# Patient Record
Sex: FEMALE | Race: WHITE | Employment: UNEMPLOYED | URBAN - METROPOLITAN AREA
[De-identification: names, ages, dates, MRNs, and addresses within clinical notes are randomized per-mention and may not be internally consistent; named-entity substitution may affect disease eponyms.]

---

## 2019-04-29 ENCOUNTER — HOSPITAL ENCOUNTER (OUTPATIENT)
Dept: MRI IMAGING | Age: 64
Discharge: HOME | End: 2019-04-29
Payer: COMMERCIAL

## 2019-04-29 DIAGNOSIS — M54.16 LUMBAR RADICULOPATHY: ICD-10-CM

## 2019-04-29 PROCEDURE — 72148 MRI LUMBAR SPINE W/O DYE: CPT

## 2019-10-02 ENCOUNTER — HOSPITAL ENCOUNTER (OUTPATIENT)
Dept: PREADMISSION TESTING | Age: 64
Discharge: HOME | End: 2019-10-02
Payer: COMMERCIAL

## 2019-10-02 ENCOUNTER — HOSPITAL ENCOUNTER (OUTPATIENT)
Dept: GENERAL RADIOLOGY | Age: 64
Discharge: HOME | End: 2019-10-02
Attending: ORTHOPAEDIC SURGERY
Payer: COMMERCIAL

## 2019-10-02 VITALS
BODY MASS INDEX: 26.1 KG/M2 | WEIGHT: 124.34 LBS | DIASTOLIC BLOOD PRESSURE: 64 MMHG | SYSTOLIC BLOOD PRESSURE: 160 MMHG | TEMPERATURE: 98.2 F | HEART RATE: 56 BPM | RESPIRATION RATE: 20 BRPM | HEIGHT: 58 IN | OXYGEN SATURATION: 97 %

## 2019-10-02 LAB
25(OH)D3 SERPL-MCNC: 19.3 NG/ML (ref 30–100)
ABO + RH BLD: NORMAL
ALBUMIN SERPL-MCNC: 3.6 G/DL (ref 3.5–5)
ALBUMIN/GLOB SERPL: 1 {RATIO} (ref 1.1–2.2)
ALP SERPL-CCNC: 84 U/L (ref 45–117)
ALT SERPL-CCNC: 26 U/L (ref 12–78)
ANION GAP SERPL CALC-SCNC: 2 MMOL/L (ref 5–15)
APPEARANCE UR: ABNORMAL
AST SERPL-CCNC: 20 U/L (ref 15–37)
BACTERIA URNS QL MICRO: NEGATIVE /HPF
BILIRUB SERPL-MCNC: 0.2 MG/DL (ref 0.2–1)
BILIRUB UR QL: NEGATIVE
BLOOD GROUP ANTIBODIES SERPL: NORMAL
BUN SERPL-MCNC: 23 MG/DL (ref 6–20)
BUN/CREAT SERPL: 23 (ref 12–20)
CALCIUM SERPL-MCNC: 8.8 MG/DL (ref 8.5–10.1)
CHLORIDE SERPL-SCNC: 113 MMOL/L (ref 97–108)
CO2 SERPL-SCNC: 27 MMOL/L (ref 21–32)
COLOR UR: ABNORMAL
CREAT SERPL-MCNC: 1 MG/DL (ref 0.55–1.02)
EPITH CASTS URNS QL MICRO: ABNORMAL /LPF
ERYTHROCYTE [DISTWIDTH] IN BLOOD BY AUTOMATED COUNT: 12.9 % (ref 11.5–14.5)
GLOBULIN SER CALC-MCNC: 3.6 G/DL (ref 2–4)
GLUCOSE SERPL-MCNC: 75 MG/DL (ref 65–100)
GLUCOSE UR STRIP.AUTO-MCNC: NEGATIVE MG/DL
HCT VFR BLD AUTO: 38.5 % (ref 35–47)
HGB BLD-MCNC: 12.2 G/DL (ref 11.5–16)
HGB UR QL STRIP: NEGATIVE
INR PPP: 1 (ref 0.9–1.1)
KETONES UR QL STRIP.AUTO: NEGATIVE MG/DL
LEUKOCYTE ESTERASE UR QL STRIP.AUTO: NEGATIVE
MCH RBC QN AUTO: 31.9 PG (ref 26–34)
MCHC RBC AUTO-ENTMCNC: 31.7 G/DL (ref 30–36.5)
MCV RBC AUTO: 100.5 FL (ref 80–99)
NITRITE UR QL STRIP.AUTO: NEGATIVE
NRBC # BLD: 0 K/UL (ref 0–0.01)
NRBC BLD-RTO: 0 PER 100 WBC
PH UR STRIP: 7 [PH] (ref 5–8)
PLATELET # BLD AUTO: 249 K/UL (ref 150–400)
PMV BLD AUTO: 9.9 FL (ref 8.9–12.9)
POTASSIUM SERPL-SCNC: 4.1 MMOL/L (ref 3.5–5.1)
PREALB SERPL-MCNC: 26.9 MG/DL (ref 20–40)
PROT SERPL-MCNC: 7.2 G/DL (ref 6.4–8.2)
PROT UR STRIP-MCNC: NEGATIVE MG/DL
PROTHROMBIN TIME: 9.8 SEC (ref 9–11.1)
RBC # BLD AUTO: 3.83 M/UL (ref 3.8–5.2)
RBC #/AREA URNS HPF: ABNORMAL /HPF (ref 0–5)
SODIUM SERPL-SCNC: 142 MMOL/L (ref 136–145)
SP GR UR REFRACTOMETRY: 1.02 (ref 1–1.03)
SPECIMEN EXP DATE BLD: NORMAL
UA: UC IF INDICATED,UAUC: ABNORMAL
UROBILINOGEN UR QL STRIP.AUTO: 1 EU/DL (ref 0.2–1)
WBC # BLD AUTO: 6.1 K/UL (ref 3.6–11)
WBC URNS QL MICRO: ABNORMAL /HPF (ref 0–4)

## 2019-10-02 PROCEDURE — 80053 COMPREHEN METABOLIC PANEL: CPT

## 2019-10-02 PROCEDURE — 36415 COLL VENOUS BLD VENIPUNCTURE: CPT

## 2019-10-02 PROCEDURE — 85610 PROTHROMBIN TIME: CPT

## 2019-10-02 PROCEDURE — 81001 URINALYSIS AUTO W/SCOPE: CPT

## 2019-10-02 PROCEDURE — 83036 HEMOGLOBIN GLYCOSYLATED A1C: CPT

## 2019-10-02 PROCEDURE — 84134 ASSAY OF PREALBUMIN: CPT

## 2019-10-02 PROCEDURE — 85027 COMPLETE CBC AUTOMATED: CPT

## 2019-10-02 PROCEDURE — 97530 THERAPEUTIC ACTIVITIES: CPT

## 2019-10-02 PROCEDURE — 82306 VITAMIN D 25 HYDROXY: CPT

## 2019-10-02 PROCEDURE — 97161 PT EVAL LOW COMPLEX 20 MIN: CPT

## 2019-10-02 PROCEDURE — 86900 BLOOD TYPING SEROLOGIC ABO: CPT

## 2019-10-02 PROCEDURE — 71046 X-RAY EXAM CHEST 2 VIEWS: CPT

## 2019-10-02 RX ORDER — ALBUTEROL SULFATE 90 UG/1
2 AEROSOL, METERED RESPIRATORY (INHALATION) AS NEEDED
COMMUNITY

## 2019-10-02 RX ORDER — ACETAMINOPHEN 10 MG/ML
1000 INJECTION, SOLUTION INTRAVENOUS ONCE
Status: CANCELLED | OUTPATIENT
Start: 2019-10-14 | End: 2019-10-14

## 2019-10-02 RX ORDER — METOPROLOL TARTRATE 25 MG/1
25 TABLET, FILM COATED ORAL
COMMUNITY

## 2019-10-02 RX ORDER — FLUTICASONE PROPIONATE AND SALMETEROL 250; 50 UG/1; UG/1
1 POWDER RESPIRATORY (INHALATION) 2 TIMES DAILY
COMMUNITY

## 2019-10-02 RX ORDER — ASPIRIN 81 MG/1
81 TABLET ORAL DAILY
COMMUNITY
End: 2019-10-16

## 2019-10-02 RX ORDER — PREGABALIN 150 MG/1
150 CAPSULE ORAL ONCE
Status: CANCELLED | OUTPATIENT
Start: 2019-10-14 | End: 2019-10-14

## 2019-10-02 RX ORDER — TOPIRAMATE 100 MG/1
100 TABLET, FILM COATED ORAL 2 TIMES DAILY
COMMUNITY

## 2019-10-02 RX ORDER — CHOLECALCIFEROL TAB 125 MCG (5000 UNIT) 125 MCG
5000 TAB ORAL DAILY
COMMUNITY

## 2019-10-02 RX ORDER — ATORVASTATIN CALCIUM 20 MG/1
20 TABLET, FILM COATED ORAL
COMMUNITY

## 2019-10-02 RX ORDER — ALPRAZOLAM 0.5 MG/1
TABLET ORAL 2 TIMES DAILY
COMMUNITY

## 2019-10-02 RX ORDER — DIPHENHYDRAMINE HCL 25 MG
25 CAPSULE ORAL AS NEEDED
COMMUNITY

## 2019-10-02 RX ORDER — LEVOFLOXACIN 5 MG/ML
500 INJECTION, SOLUTION INTRAVENOUS ONCE
Status: CANCELLED | OUTPATIENT
Start: 2019-10-14 | End: 2019-10-14

## 2019-10-02 RX ORDER — GABAPENTIN 100 MG/1
100 CAPSULE ORAL 3 TIMES DAILY
COMMUNITY

## 2019-10-02 RX ORDER — PROMETHAZINE HYDROCHLORIDE 25 MG/1
25 TABLET ORAL AS NEEDED
COMMUNITY

## 2019-10-02 RX ORDER — BACLOFEN 10 MG/1
10 TABLET ORAL 3 TIMES DAILY
COMMUNITY

## 2019-10-02 NOTE — PERIOP NOTES
[facility]  Joint/Spine Preoperative Instructions        Surgery Date 10/14/19         Time of Arrival 1200  Contact # [phone]    1. On the day of your surgery, please report to the Surgical Services Registration Desk and sign in at your designated time. The Surgery Center is located to the right of the Emergency Room. 2. You must have someone with you to drive you home. You should not drive a car for 24 hours following surgery. Please make arrangements for a friend or family member to stay with you for the first 24 hours after your surgery. 3. No food after midnight 10/13/19. Medications morning of surgery should be taken with a sip of water. Please follow pre-surgery drink instructions that were given at your Pre Admission Testing appointment. 4. We recommend you do not drink any alcoholic beverages for 24 hours before and after your surgery. 5. Contact your surgeons office for instructions on the following medications: non-steroidal anti-inflammatory drugs (i.e. Advil, Aleve), vitamins, and supplements. (Some surgeons will want you to stop these medications prior to surgery and others may allow you to take them)  **If you are currently taking Plavix, Coumadin, Aspirin and/or other blood-thinning agents, contact your surgeon for instructions. ** Your surgeon will partner with the physician prescribing these medications to determine if it is safe to stop or if you need to continue taking. Please do not stop taking these medications without instructions from your surgeon    6. Wear comfortable clothes. Wear glasses instead of contacts. Do not bring any money or jewelry. Please bring picture ID, insurance card, and any prearranged co-payment or hospital payment. Do not wear make-up, particularly mascara the morning of your surgery. Do not wear nail polish, particularly if you are having foot /hand surgery.   Wear your hair loose or down, no ponytails, buns, charles pins or clips. All body piercings must be removed. Please shower with antibacterial soap for three consecutive days before and on the morning of surgery, but do not apply any lotions, powders or deodorants after the shower on the day of surgery. Please use a fresh towels after each shower. Please sleep in clean clothes and change bed linens the night before surgery. Please do not shave for 48 hours prior to surgery. Shaving of the face is acceptable. 7. You should understand that if you do not follow these instructions your surgery may be cancelled. If your physical condition changes (I.e. fever, cold or flu) please contact your surgeon as soon as possible. 8. It is important that you be on time. If a situation occurs where you may be late, please call [phone] (OR Holding Area). 9. If you have any questions and or problems, please call [phone] (Pre-admission Testing). 10. Your surgery time may be subject to change. You will receive a phone call the evening prior if your time changes. 11.  If having outpatient surgery, you must have someone to drive you here, stay with you during the duration of your stay, and to drive you home at time of discharge. 12.   In an effort to improve the efficiency, privacy, and safety for all of our Pre-op patients visitors are not allowed in the Holding area. Once you arrive and are registered your family/visitors will be asked to remain in the waiting room. The Pre-op staff will get you from the Surgical Waiting Area and will explain to you and your family/visitors that the Pre-op phase is beginning. The staff will answer any questions and provide instructions for tracking of the patient, by use of the existing tracking number and color-coded status board in the waiting room.   At this time the staff will also ask for your designated spokesperson information in the event that the physician or staff need to provide an update or obtain any pertinent information. The designated spokesperson will be notified if the physician needs to speak to family during the pre-operative phase. If at any time your family/visitors has questions or concerns they may approach the volunteer desk in the waiting area for assistance. Special Instructions:Practice with the incentive spirometry/please bring incentive spirometry back to the hospital for use  Please drink the Pre-Surgery drinks as instructed    [address] WITH A SIP OF WATER:  Inhaler as needed,please bring inhaler to the hospital,xanax,baclofen,topamax  Metoprolol,gabapentin,diskus inhaler,atorvastatin,phenergan as needed,benadryl as needed    I understand a pre-operative phone call will be made to verify my surgery time. In the event that I am not available, I give permission for a message to be left on my answering service and/or with another person?   yes        ___________________      __________   _________    (Signature of Patient)             (Witness)                (Date and Time)

## 2019-10-02 NOTE — PERIOP NOTES
Preventing Infections Before and After  Your Surgery    IMPORTANT INSTRUCTIONS    Please read and follow these instructions carefully. If you are unable to comply with the below instructions your procedure will be cancelled. Every Night for Three (3) nights before your surgery:  1. Shower with an antibacterial soap, such as Dial, or the soap provided at your preassessment appointment. A shower is better than a bath for cleaning your skin. 2. If needed, ask someone to help you reach all areas of your body. Dont forget to clean your belly button with every shower. The night before your surgery: If you lose your Hibiclens please contact surgery center or you can purchase it at a local pharmacy  1. On the night before your surgery, shower with an antibacterial soap, such as Dial, or the soap provided at your preassessment appointment. 2. With one packet of Hibiclens in hand, turn water off.  3. Apply Hibiclens antiseptic skin cleanser with a clean, freshly washed washcloth. ? Gently apply to your body from chin to toes (except the genital area) and especially the area(s) where your incision(s) will be. ? Leave Hibiclens on your skin for at least 20 seconds. CAUTION: If needed, Hibiclens may be used to clean the folds of skin of the legs (such as in the area of the groin) and on your buttocks and hips. However, do not use Hibiclens above the neck or in the genital area (your bottom) or put inside any area of your body. 4. Turn the water back on and rinse. 5. Dry gently with a clean, freshly washed towel. 6. After your shower, do not use any powder, deodorant, perfumes or lotion. 7. Use clean, freshly washed towels and washcloths every time you shower. 8. Wear clean, freshly washed pajamas to bed the night before surgery. 9. Sleep on clean, freshly washed sheets. 10. Do not allow pets to sleep in your bed with you. The Morning of your surgery:  1.  Shower again thoroughly with an antibacterial soap, such as Dial or the soap provided at your preassessment appointment. If needed, ask someone for help to reach all areas of your body. Dont forget to clean your belly button! Rinse. 2. Dry gently with a clean, freshly washed towel. 3. After your shower, do not use any powder, deodorant, perfumes or lotion prior to surgery. 4. Put on clean, freshly washed clothing. Tips to help prevent infections after your surgery:  1. Protect your surgical wound from germs:  ? Hand washing is the most important thing you and your caregivers can do to prevent infections. ? Keep your bandage clean and dry! ? Do not touch your surgical wound. 2. Use clean, freshly washed towels and washcloths every time you shower; do not share bath linens with others. 3. Until your surgical wound is healed, wear clothing and sleep on bed linens each day that are clean and freshly washed. 4. Do not allow pets to sleep in your bed with you or touch your surgical wound. 5. Do not smoke  smoking delays wound healing. This may be a good time to stop smoking. 6. If you have diabetes, it is important for you to manage your blood sugar levels properly before your surgery as well as after your surgery. Poorly managed blood sugar levels slow down wound healing and prevent you from healing completely. If you lose your Hibiclens, please call the [facility], or it is available for purchase at your pharmacy.                ___________________      ___________________      ________________  (Signature of Patient)          (Witness)                   (Date and Time)
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
## 2019-10-02 NOTE — PERIOP NOTES
Incentive Spirometer        Using the incentive spirometer helps expand the small air sacs of your lungs, helps you breathe deeply, and helps improve your lung function. Use your incentive spirometer twice a day (10 breaths each time) prior to surgery. How to Use Your Incentive Spirometer:  1. Hold the incentive spirometer in an upright position. 2. Breathe out as usual.   3. Place the mouthpiece in your mouth and seal your lips tightly around it. 4. Take a deep breath. Breathe in slowly and as deeply as possible. Keep the blue flow rate guide between the arrows. 5. Hold your breath as long as possible. Then exhale slowly and allow the piston to fall to the bottom of the column. 6. Rest for a few seconds and repeat steps one through five at least 10 times. PAT Tidal Volume____1500______________  x_____2___________  Date______10/02/19_________________    Thennita Porrasie THE INCENTIVE SPIROMETER WITH YOU TO THE HOSPITAL ON THE DAY OF YOUR SURGERY. Opportunity given to ask and answer questions as well as to observe return demonstration.     Patient signature_____________________________    Witness____________________________

## 2019-10-02 NOTE — PERIOP NOTES

## 2019-10-03 LAB
BACTERIA SPEC CULT: ABNORMAL
BACTERIA SPEC CULT: ABNORMAL
EST. AVERAGE GLUCOSE BLD GHB EST-MCNC: 108 MG/DL
HBA1C MFR BLD: 5.4 % (ref 4.2–6.3)
SERVICE CMNT-IMP: ABNORMAL

## 2019-10-03 RX ORDER — MUPIROCIN 20 MG/G
OINTMENT TOPICAL 2 TIMES DAILY
Qty: 22 G | Refills: 0 | Status: SHIPPED | OUTPATIENT
Start: 2019-10-03 | End: 2019-10-08

## 2019-10-03 RX ORDER — MUPIROCIN 20 MG/G
OINTMENT TOPICAL 2 TIMES DAILY
COMMUNITY
Start: 2019-10-03 | End: 2019-10-08

## 2019-10-03 NOTE — ADVANCED PRACTICE NURSE
Mupirocin ointment escribed to pt's pharmacy on file. LM on VM for pt to return call regarding results and need for tx. PTA medlist updated.

## 2019-10-04 NOTE — PERIOP NOTES
Patient returned phone call and I instructed her to  mupirocin prescription today and gave instructions ( to use in nares bid). Patient verbalized understanding.

## 2019-10-14 ENCOUNTER — ANESTHESIA EVENT (OUTPATIENT)
Dept: SURGERY | Age: 64
DRG: 455 | End: 2019-10-14
Payer: COMMERCIAL

## 2019-10-14 ENCOUNTER — APPOINTMENT (OUTPATIENT)
Dept: GENERAL RADIOLOGY | Age: 64
DRG: 455 | End: 2019-10-14
Attending: ORTHOPAEDIC SURGERY
Payer: COMMERCIAL

## 2019-10-14 ENCOUNTER — HOSPITAL ENCOUNTER (INPATIENT)
Age: 64
LOS: 2 days | Discharge: HOME | DRG: 455 | End: 2019-10-16
Attending: ORTHOPAEDIC SURGERY | Admitting: ORTHOPAEDIC SURGERY
Payer: COMMERCIAL

## 2019-10-14 ENCOUNTER — APPOINTMENT (OUTPATIENT)
Dept: CT IMAGING | Age: 64
DRG: 455 | End: 2019-10-14
Attending: ORTHOPAEDIC SURGERY
Payer: COMMERCIAL

## 2019-10-14 ENCOUNTER — ANESTHESIA (OUTPATIENT)
Dept: SURGERY | Age: 64
DRG: 455 | End: 2019-10-14
Payer: COMMERCIAL

## 2019-10-14 DIAGNOSIS — Z98.1 S/P LUMBAR SPINAL FUSION: Primary | ICD-10-CM

## 2019-10-14 PROBLEM — M48.02 CERVICAL STENOSIS OF SPINAL CANAL: Status: ACTIVE | Noted: 2019-10-14

## 2019-10-14 PROCEDURE — 77030014007 HC SPNG HEMSTAT J&J -B: Performed by: ORTHOPAEDIC SURGERY

## 2019-10-14 PROCEDURE — 77030035129: Performed by: ORTHOPAEDIC SURGERY

## 2019-10-14 PROCEDURE — 77030034475 HC MISC IMPL SPN: Performed by: ORTHOPAEDIC SURGERY

## 2019-10-14 PROCEDURE — 77030014647 HC SEAL FBRN TISSL BAXT -D: Performed by: ORTHOPAEDIC SURGERY

## 2019-10-14 PROCEDURE — 76210000006 HC OR PH I REC 0.5 TO 1 HR: Performed by: ORTHOPAEDIC SURGERY

## 2019-10-14 PROCEDURE — 77030019908 HC STETH ESOPH SIMS -A: Performed by: ANESTHESIOLOGY

## 2019-10-14 PROCEDURE — 77030014008 HC SPNG HEMSTAT J&J -C: Performed by: ORTHOPAEDIC SURGERY

## 2019-10-14 PROCEDURE — P9045 ALBUMIN (HUMAN), 5%, 250 ML: HCPCS | Performed by: NURSE ANESTHETIST, CERTIFIED REGISTERED

## 2019-10-14 PROCEDURE — 77030040361 HC SLV COMPR DVT MDII -B: Performed by: ORTHOPAEDIC SURGERY

## 2019-10-14 PROCEDURE — 76010000171 HC OR TIME 2 TO 2.5 HR INTENSV-TIER 1: Performed by: ORTHOPAEDIC SURGERY

## 2019-10-14 PROCEDURE — 77030018846 HC SOL IRR STRL H20 ICUM -A: Performed by: ORTHOPAEDIC SURGERY

## 2019-10-14 PROCEDURE — C1713 ANCHOR/SCREW BN/BN,TIS/BN: HCPCS | Performed by: ORTHOPAEDIC SURGERY

## 2019-10-14 PROCEDURE — 77030040922 HC BLNKT HYPOTHRM STRY -A

## 2019-10-14 PROCEDURE — 74011250636 HC RX REV CODE- 250/636: Performed by: NURSE ANESTHETIST, CERTIFIED REGISTERED

## 2019-10-14 PROCEDURE — 65270000029 HC RM PRIVATE

## 2019-10-14 PROCEDURE — 77030003029 HC SUT VCRL J&J -B: Performed by: ORTHOPAEDIC SURGERY

## 2019-10-14 PROCEDURE — 77030008462 HC STPLR SKN PROX J&J -A: Performed by: ORTHOPAEDIC SURGERY

## 2019-10-14 PROCEDURE — 77030026438 HC STYL ET INTUB CARD -A: Performed by: ANESTHESIOLOGY

## 2019-10-14 PROCEDURE — 74011000250 HC RX REV CODE- 250: Performed by: NURSE ANESTHETIST, CERTIFIED REGISTERED

## 2019-10-14 PROCEDURE — 77030039267 HC ADH SKN EXOFIN S2SG -B: Performed by: ORTHOPAEDIC SURGERY

## 2019-10-14 PROCEDURE — 77030029099 HC BN WAX SSPC -A: Performed by: ORTHOPAEDIC SURGERY

## 2019-10-14 PROCEDURE — 77030040356 HC CORD BPLR FRCP COVD -A: Performed by: ORTHOPAEDIC SURGERY

## 2019-10-14 PROCEDURE — 77030020268 HC MISC GENERAL SUPPLY: Performed by: ORTHOPAEDIC SURGERY

## 2019-10-14 PROCEDURE — 77030008684 HC TU ET CUF COVD -B: Performed by: ANESTHESIOLOGY

## 2019-10-14 PROCEDURE — 77030033138 HC SUT PGA STRATFX J&J -B: Performed by: ORTHOPAEDIC SURGERY

## 2019-10-14 PROCEDURE — 77030003028 HC SUT VCRL J&J -A: Performed by: ORTHOPAEDIC SURGERY

## 2019-10-14 PROCEDURE — 74011250637 HC RX REV CODE- 250/637: Performed by: ORTHOPAEDIC SURGERY

## 2019-10-14 PROCEDURE — 77030005513 HC CATH URETH FOL11 MDII -B: Performed by: ORTHOPAEDIC SURGERY

## 2019-10-14 PROCEDURE — 74011250636 HC RX REV CODE- 250/636: Performed by: ORTHOPAEDIC SURGERY

## 2019-10-14 PROCEDURE — 07DR0ZZ EXTRACTION OF ILIAC BONE MARROW, OPEN APPROACH: ICD-10-PCS | Performed by: ORTHOPAEDIC SURGERY

## 2019-10-14 PROCEDURE — 76000 FLUOROSCOPY <1 HR PHYS/QHP: CPT

## 2019-10-14 PROCEDURE — 0SG00A0 FUSION OF LUMBAR VERTEBRAL JOINT WITH INTERBODY FUSION DEVICE, ANTERIOR APPROACH, ANTERIOR COLUMN, OPEN APPROACH: ICD-10-PCS | Performed by: ORTHOPAEDIC SURGERY

## 2019-10-14 PROCEDURE — 77030010512 HC APPL CLP LIG J&J -C: Performed by: ORTHOPAEDIC SURGERY

## 2019-10-14 PROCEDURE — 77030029251 HC SPCR SPN GLMB -K1: Performed by: ORTHOPAEDIC SURGERY

## 2019-10-14 PROCEDURE — 77030018836 HC SOL IRR NACL ICUM -A: Performed by: ORTHOPAEDIC SURGERY

## 2019-10-14 PROCEDURE — 76060000035 HC ANESTHESIA 2 TO 2.5 HR: Performed by: ORTHOPAEDIC SURGERY

## 2019-10-14 PROCEDURE — 74011250636 HC RX REV CODE- 250/636: Performed by: ANESTHESIOLOGY

## 2019-10-14 PROCEDURE — 72100 X-RAY EXAM L-S SPINE 2/3 VWS: CPT

## 2019-10-14 DEVICE — RISE-L SPACER 22 X 50MM, 7-14MM, 3-15&DEG;
Type: IMPLANTABLE DEVICE | Site: SPINE LUMBAR | Status: FUNCTIONAL
Brand: RISE-L

## 2019-10-14 DEVICE — PLYMOUTH THORACOLUMBAR PLATE, 19MM
Type: IMPLANTABLE DEVICE | Site: SPINE LUMBAR | Status: FUNCTIONAL
Brand: PLYMOUTH

## 2019-10-14 DEVICE — 5.5MM VARIABLE ANGLE SCREW, 26MM
Type: IMPLANTABLE DEVICE | Site: SPINE LUMBAR | Status: FUNCTIONAL
Brand: TRUSS

## 2019-10-14 RX ORDER — HYDROMORPHONE HYDROCHLORIDE 1 MG/ML
.2-.5 INJECTION, SOLUTION INTRAMUSCULAR; INTRAVENOUS; SUBCUTANEOUS
Status: DISCONTINUED | OUTPATIENT
Start: 2019-10-14 | End: 2019-10-14

## 2019-10-14 RX ORDER — NALOXONE HYDROCHLORIDE 0.4 MG/ML
0.4 INJECTION, SOLUTION INTRAMUSCULAR; INTRAVENOUS; SUBCUTANEOUS AS NEEDED
Status: DISCONTINUED | OUTPATIENT
Start: 2019-10-14 | End: 2019-10-16 | Stop reason: HOSPADM

## 2019-10-14 RX ORDER — ACETAMINOPHEN 500 MG
1000 TABLET ORAL EVERY 6 HOURS
Status: DISCONTINUED | OUTPATIENT
Start: 2019-10-14 | End: 2019-10-16 | Stop reason: HOSPADM

## 2019-10-14 RX ORDER — GABAPENTIN 100 MG/1
100 CAPSULE ORAL 3 TIMES DAILY
Status: DISCONTINUED | OUTPATIENT
Start: 2019-10-14 | End: 2019-10-16 | Stop reason: HOSPADM

## 2019-10-14 RX ORDER — NEOSTIGMINE METHYLSULFATE 1 MG/ML
INJECTION INTRAVENOUS AS NEEDED
Status: DISCONTINUED | OUTPATIENT
Start: 2019-10-14 | End: 2019-10-14 | Stop reason: HOSPADM

## 2019-10-14 RX ORDER — SODIUM CHLORIDE 0.9 % (FLUSH) 0.9 %
5-40 SYRINGE (ML) INJECTION AS NEEDED
Status: DISCONTINUED | OUTPATIENT
Start: 2019-10-14 | End: 2019-10-15 | Stop reason: HOSPADM

## 2019-10-14 RX ORDER — DIPHENHYDRAMINE HYDROCHLORIDE 50 MG/ML
12.5 INJECTION, SOLUTION INTRAMUSCULAR; INTRAVENOUS AS NEEDED
Status: ACTIVE | OUTPATIENT
Start: 2019-10-14 | End: 2019-10-14

## 2019-10-14 RX ORDER — GLYCOPYRROLATE 0.2 MG/ML
INJECTION INTRAMUSCULAR; INTRAVENOUS AS NEEDED
Status: DISCONTINUED | OUTPATIENT
Start: 2019-10-14 | End: 2019-10-14 | Stop reason: HOSPADM

## 2019-10-14 RX ORDER — ACETAMINOPHEN 10 MG/ML
1000 INJECTION, SOLUTION INTRAVENOUS ONCE
Status: COMPLETED | OUTPATIENT
Start: 2019-10-14 | End: 2019-10-14

## 2019-10-14 RX ORDER — MELATONIN
5000 DAILY
Status: DISCONTINUED | OUTPATIENT
Start: 2019-10-15 | End: 2019-10-16 | Stop reason: HOSPADM

## 2019-10-14 RX ORDER — SODIUM CHLORIDE 0.9 % (FLUSH) 0.9 %
5-40 SYRINGE (ML) INJECTION AS NEEDED
Status: DISCONTINUED | OUTPATIENT
Start: 2019-10-14 | End: 2019-10-16 | Stop reason: HOSPADM

## 2019-10-14 RX ORDER — ROCURONIUM BROMIDE 10 MG/ML
INJECTION, SOLUTION INTRAVENOUS AS NEEDED
Status: DISCONTINUED | OUTPATIENT
Start: 2019-10-14 | End: 2019-10-14 | Stop reason: HOSPADM

## 2019-10-14 RX ORDER — VITAMIN E CAP 100 UNIT 100 UNIT
200 CAP ORAL DAILY
Status: DISCONTINUED | OUTPATIENT
Start: 2019-10-15 | End: 2019-10-16 | Stop reason: HOSPADM

## 2019-10-14 RX ORDER — LIDOCAINE HYDROCHLORIDE 20 MG/ML
INJECTION, SOLUTION EPIDURAL; INFILTRATION; INTRACAUDAL; PERINEURAL AS NEEDED
Status: DISCONTINUED | OUTPATIENT
Start: 2019-10-14 | End: 2019-10-14 | Stop reason: HOSPADM

## 2019-10-14 RX ORDER — FLUTICASONE FUROATE AND VILANTEROL 100; 25 UG/1; UG/1
1 POWDER RESPIRATORY (INHALATION) DAILY
Status: DISCONTINUED | OUTPATIENT
Start: 2019-10-15 | End: 2019-10-16 | Stop reason: HOSPADM

## 2019-10-14 RX ORDER — UBIDECARENONE 50 MG
200 CAPSULE ORAL DAILY
Status: DISCONTINUED | OUTPATIENT
Start: 2019-10-15 | End: 2019-10-16 | Stop reason: HOSPADM

## 2019-10-14 RX ORDER — ALBUMIN HUMAN 50 G/1000ML
SOLUTION INTRAVENOUS AS NEEDED
Status: DISCONTINUED | OUTPATIENT
Start: 2019-10-14 | End: 2019-10-14 | Stop reason: HOSPADM

## 2019-10-14 RX ORDER — ALPRAZOLAM 0.5 MG/1
0.5 TABLET ORAL 2 TIMES DAILY
Status: DISCONTINUED | OUTPATIENT
Start: 2019-10-14 | End: 2019-10-16 | Stop reason: HOSPADM

## 2019-10-14 RX ORDER — SODIUM CHLORIDE, SODIUM LACTATE, POTASSIUM CHLORIDE, CALCIUM CHLORIDE 600; 310; 30; 20 MG/100ML; MG/100ML; MG/100ML; MG/100ML
25 INJECTION, SOLUTION INTRAVENOUS CONTINUOUS
Status: DISCONTINUED | OUTPATIENT
Start: 2019-10-14 | End: 2019-10-15 | Stop reason: HOSPADM

## 2019-10-14 RX ORDER — SODIUM CHLORIDE 9 MG/ML
125 INJECTION, SOLUTION INTRAVENOUS CONTINUOUS
Status: DISPENSED | OUTPATIENT
Start: 2019-10-14 | End: 2019-10-15

## 2019-10-14 RX ORDER — HYDROMORPHONE HYDROCHLORIDE 2 MG/1
4 TABLET ORAL
Status: DISCONTINUED | OUTPATIENT
Start: 2019-10-14 | End: 2019-10-15

## 2019-10-14 RX ORDER — GLUCOSAM/CHONDRO/HERB 149/HYAL 750-100 MG
1 TABLET ORAL DAILY
Status: DISCONTINUED | OUTPATIENT
Start: 2019-10-15 | End: 2019-10-16 | Stop reason: HOSPADM

## 2019-10-14 RX ORDER — FACIAL-BODY WIPES
10 EACH TOPICAL DAILY PRN
Status: DISCONTINUED | OUTPATIENT
Start: 2019-10-16 | End: 2019-10-16 | Stop reason: HOSPADM

## 2019-10-14 RX ORDER — SODIUM CHLORIDE 0.9 % (FLUSH) 0.9 %
5-40 SYRINGE (ML) INJECTION EVERY 8 HOURS
Status: DISCONTINUED | OUTPATIENT
Start: 2019-10-14 | End: 2019-10-16 | Stop reason: HOSPADM

## 2019-10-14 RX ORDER — SODIUM CHLORIDE 0.9 % (FLUSH) 0.9 %
5-40 SYRINGE (ML) INJECTION EVERY 8 HOURS
Status: DISCONTINUED | OUTPATIENT
Start: 2019-10-14 | End: 2019-10-15 | Stop reason: HOSPADM

## 2019-10-14 RX ORDER — EPHEDRINE SULFATE/0.9% NACL/PF 50 MG/5 ML
SYRINGE (ML) INTRAVENOUS AS NEEDED
Status: DISCONTINUED | OUTPATIENT
Start: 2019-10-14 | End: 2019-10-14 | Stop reason: HOSPADM

## 2019-10-14 RX ORDER — ATORVASTATIN CALCIUM 20 MG/1
20 TABLET, FILM COATED ORAL DAILY
Status: DISCONTINUED | OUTPATIENT
Start: 2019-10-15 | End: 2019-10-16 | Stop reason: HOSPADM

## 2019-10-14 RX ORDER — ONDANSETRON 2 MG/ML
4 INJECTION INTRAMUSCULAR; INTRAVENOUS AS NEEDED
Status: DISCONTINUED | OUTPATIENT
Start: 2019-10-14 | End: 2019-10-15 | Stop reason: HOSPADM

## 2019-10-14 RX ORDER — PROPOFOL 10 MG/ML
INJECTION, EMULSION INTRAVENOUS AS NEEDED
Status: DISCONTINUED | OUTPATIENT
Start: 2019-10-14 | End: 2019-10-14 | Stop reason: HOSPADM

## 2019-10-14 RX ORDER — PREGABALIN 75 MG/1
150 CAPSULE ORAL ONCE
Status: COMPLETED | OUTPATIENT
Start: 2019-10-14 | End: 2019-10-14

## 2019-10-14 RX ORDER — VITAMIN E ACETATE 134 MG
200 CAPSULE ORAL DAILY
Status: DISCONTINUED | OUTPATIENT
Start: 2019-10-15 | End: 2019-10-14

## 2019-10-14 RX ORDER — HYDROMORPHONE HYDROCHLORIDE 2 MG/1
2 TABLET ORAL
Status: DISCONTINUED | OUTPATIENT
Start: 2019-10-14 | End: 2019-10-15

## 2019-10-14 RX ORDER — SODIUM CHLORIDE, SODIUM LACTATE, POTASSIUM CHLORIDE, CALCIUM CHLORIDE 600; 310; 30; 20 MG/100ML; MG/100ML; MG/100ML; MG/100ML
25 INJECTION, SOLUTION INTRAVENOUS CONTINUOUS
Status: DISCONTINUED | OUTPATIENT
Start: 2019-10-14 | End: 2019-10-14

## 2019-10-14 RX ORDER — SODIUM CHLORIDE 0.9 % (FLUSH) 0.9 %
5-40 SYRINGE (ML) INJECTION AS NEEDED
Status: DISCONTINUED | OUTPATIENT
Start: 2019-10-14 | End: 2019-10-14

## 2019-10-14 RX ORDER — ALBUTEROL SULFATE 90 UG/1
2 AEROSOL, METERED RESPIRATORY (INHALATION) AS NEEDED
Status: DISCONTINUED | OUTPATIENT
Start: 2019-10-14 | End: 2019-10-16 | Stop reason: HOSPADM

## 2019-10-14 RX ORDER — UREA 10 %
100 LOTION (ML) TOPICAL DAILY
Status: DISCONTINUED | OUTPATIENT
Start: 2019-10-15 | End: 2019-10-16 | Stop reason: HOSPADM

## 2019-10-14 RX ORDER — HYDROMORPHONE HYDROCHLORIDE 2 MG/ML
INJECTION, SOLUTION INTRAMUSCULAR; INTRAVENOUS; SUBCUTANEOUS AS NEEDED
Status: DISCONTINUED | OUTPATIENT
Start: 2019-10-14 | End: 2019-10-14 | Stop reason: HOSPADM

## 2019-10-14 RX ORDER — HYDROXYZINE HYDROCHLORIDE 10 MG/1
10 TABLET, FILM COATED ORAL
Status: DISCONTINUED | OUTPATIENT
Start: 2019-10-14 | End: 2019-10-16 | Stop reason: HOSPADM

## 2019-10-14 RX ORDER — TOPIRAMATE 100 MG/1
100 TABLET, FILM COATED ORAL 2 TIMES DAILY
Status: DISCONTINUED | OUTPATIENT
Start: 2019-10-14 | End: 2019-10-16 | Stop reason: HOSPADM

## 2019-10-14 RX ORDER — FENTANYL CITRATE 50 UG/ML
INJECTION, SOLUTION INTRAMUSCULAR; INTRAVENOUS AS NEEDED
Status: DISCONTINUED | OUTPATIENT
Start: 2019-10-14 | End: 2019-10-14 | Stop reason: HOSPADM

## 2019-10-14 RX ORDER — ONDANSETRON 2 MG/ML
INJECTION INTRAMUSCULAR; INTRAVENOUS AS NEEDED
Status: DISCONTINUED | OUTPATIENT
Start: 2019-10-14 | End: 2019-10-14 | Stop reason: HOSPADM

## 2019-10-14 RX ORDER — FAMOTIDINE 20 MG/1
20 TABLET, FILM COATED ORAL DAILY
Status: DISCONTINUED | OUTPATIENT
Start: 2019-10-15 | End: 2019-10-16 | Stop reason: HOSPADM

## 2019-10-14 RX ORDER — HYDROMORPHONE HYDROCHLORIDE 1 MG/ML
1 INJECTION, SOLUTION INTRAMUSCULAR; INTRAVENOUS; SUBCUTANEOUS
Status: ACTIVE | OUTPATIENT
Start: 2019-10-14 | End: 2019-10-15

## 2019-10-14 RX ORDER — DIAZEPAM 5 MG/1
5 TABLET ORAL
Status: DISCONTINUED | OUTPATIENT
Start: 2019-10-14 | End: 2019-10-16 | Stop reason: HOSPADM

## 2019-10-14 RX ORDER — ONDANSETRON 2 MG/ML
4 INJECTION INTRAMUSCULAR; INTRAVENOUS
Status: ACTIVE | OUTPATIENT
Start: 2019-10-14 | End: 2019-10-15

## 2019-10-14 RX ORDER — POLYETHYLENE GLYCOL 3350 17 G/17G
17 POWDER, FOR SOLUTION ORAL DAILY
Status: DISCONTINUED | OUTPATIENT
Start: 2019-10-15 | End: 2019-10-16 | Stop reason: HOSPADM

## 2019-10-14 RX ORDER — CYCLOBENZAPRINE HCL 10 MG
10 TABLET ORAL
Status: DISCONTINUED | OUTPATIENT
Start: 2019-10-14 | End: 2019-10-16 | Stop reason: HOSPADM

## 2019-10-14 RX ORDER — MORPHINE SULFATE 10 MG/ML
2 INJECTION, SOLUTION INTRAMUSCULAR; INTRAVENOUS
Status: DISCONTINUED | OUTPATIENT
Start: 2019-10-14 | End: 2019-10-15 | Stop reason: HOSPADM

## 2019-10-14 RX ORDER — DIPHENHYDRAMINE HCL 25 MG
25 CAPSULE ORAL AS NEEDED
Status: DISCONTINUED | OUTPATIENT
Start: 2019-10-14 | End: 2019-10-16 | Stop reason: HOSPADM

## 2019-10-14 RX ORDER — AMOXICILLIN 250 MG
1 CAPSULE ORAL 2 TIMES DAILY
Status: DISCONTINUED | OUTPATIENT
Start: 2019-10-14 | End: 2019-10-16 | Stop reason: HOSPADM

## 2019-10-14 RX ORDER — MIDAZOLAM HYDROCHLORIDE 1 MG/ML
INJECTION, SOLUTION INTRAMUSCULAR; INTRAVENOUS AS NEEDED
Status: DISCONTINUED | OUTPATIENT
Start: 2019-10-14 | End: 2019-10-14 | Stop reason: HOSPADM

## 2019-10-14 RX ORDER — METOPROLOL TARTRATE 25 MG/1
25 TABLET, FILM COATED ORAL
Status: DISCONTINUED | OUTPATIENT
Start: 2019-10-15 | End: 2019-10-16 | Stop reason: HOSPADM

## 2019-10-14 RX ORDER — FENTANYL CITRATE 50 UG/ML
25 INJECTION, SOLUTION INTRAMUSCULAR; INTRAVENOUS
Status: DISCONTINUED | OUTPATIENT
Start: 2019-10-14 | End: 2019-10-15 | Stop reason: HOSPADM

## 2019-10-14 RX ORDER — SODIUM CHLORIDE 0.9 % (FLUSH) 0.9 %
5-40 SYRINGE (ML) INJECTION EVERY 8 HOURS
Status: DISCONTINUED | OUTPATIENT
Start: 2019-10-14 | End: 2019-10-14

## 2019-10-14 RX ORDER — LEVOFLOXACIN 5 MG/ML
500 INJECTION, SOLUTION INTRAVENOUS ONCE
Status: COMPLETED | OUTPATIENT
Start: 2019-10-14 | End: 2019-10-14

## 2019-10-14 RX ORDER — PROMETHAZINE HYDROCHLORIDE 25 MG/1
25 TABLET ORAL AS NEEDED
Status: DISCONTINUED | OUTPATIENT
Start: 2019-10-14 | End: 2019-10-16 | Stop reason: HOSPADM

## 2019-10-14 RX ADMIN — ONDANSETRON HYDROCHLORIDE 4 MG: 2 INJECTION, SOLUTION INTRAMUSCULAR; INTRAVENOUS at 15:04

## 2019-10-14 RX ADMIN — SODIUM CHLORIDE, SODIUM LACTATE, POTASSIUM CHLORIDE, AND CALCIUM CHLORIDE 25 ML/HR: 600; 310; 30; 20 INJECTION, SOLUTION INTRAVENOUS at 11:06

## 2019-10-14 RX ADMIN — Medication 10 ML: at 21:38

## 2019-10-14 RX ADMIN — HYDROMORPHONE HYDROCHLORIDE 0.5 MG: 1 INJECTION, SOLUTION INTRAMUSCULAR; INTRAVENOUS; SUBCUTANEOUS at 16:35

## 2019-10-14 RX ADMIN — Medication 10 ML: at 21:33

## 2019-10-14 RX ADMIN — PROPOFOL 100 MG: 10 INJECTION, EMULSION INTRAVENOUS at 13:19

## 2019-10-14 RX ADMIN — ACETAMINOPHEN 1000 MG: 500 TABLET ORAL at 18:22

## 2019-10-14 RX ADMIN — HYDROMORPHONE HYDROCHLORIDE 2 MG: 2 TABLET ORAL at 18:22

## 2019-10-14 RX ADMIN — GABAPENTIN 100 MG: 100 CAPSULE ORAL at 18:22

## 2019-10-14 RX ADMIN — FENTANYL CITRATE 50 MCG: 50 INJECTION, SOLUTION INTRAMUSCULAR; INTRAVENOUS at 15:12

## 2019-10-14 RX ADMIN — ACETAMINOPHEN 1000 MG: 10 INJECTION, SOLUTION INTRAVENOUS at 11:06

## 2019-10-14 RX ADMIN — Medication 5 MG: at 13:31

## 2019-10-14 RX ADMIN — SODIUM CHLORIDE 125 ML/HR: 900 INJECTION, SOLUTION INTRAVENOUS at 23:20

## 2019-10-14 RX ADMIN — FENTANYL CITRATE 50 MCG: 50 INJECTION, SOLUTION INTRAMUSCULAR; INTRAVENOUS at 13:19

## 2019-10-14 RX ADMIN — MIDAZOLAM HYDROCHLORIDE 2 MG: 1 INJECTION INTRAMUSCULAR; INTRAVENOUS at 13:12

## 2019-10-14 RX ADMIN — HYDROMORPHONE HYDROCHLORIDE 0.5 MG: 1 INJECTION, SOLUTION INTRAMUSCULAR; INTRAVENOUS; SUBCUTANEOUS at 16:50

## 2019-10-14 RX ADMIN — VANCOMYCIN HYDROCHLORIDE 1000 MG: 1 INJECTION, POWDER, LYOPHILIZED, FOR SOLUTION INTRAVENOUS at 11:13

## 2019-10-14 RX ADMIN — SODIUM CHLORIDE 125 ML/HR: 900 INJECTION, SOLUTION INTRAVENOUS at 15:30

## 2019-10-14 RX ADMIN — ALPRAZOLAM 0.5 MG: 0.5 TABLET ORAL at 21:33

## 2019-10-14 RX ADMIN — HYDROMORPHONE HYDROCHLORIDE 0.4 MG: 2 INJECTION, SOLUTION INTRAMUSCULAR; INTRAVENOUS; SUBCUTANEOUS at 15:09

## 2019-10-14 RX ADMIN — FENTANYL CITRATE 25 MCG: 50 INJECTION INTRAMUSCULAR; INTRAVENOUS at 15:55

## 2019-10-14 RX ADMIN — LEVOFLOXACIN 500 MG: 5 INJECTION, SOLUTION INTRAVENOUS at 13:16

## 2019-10-14 RX ADMIN — GABAPENTIN 100 MG: 100 CAPSULE ORAL at 21:33

## 2019-10-14 RX ADMIN — VANCOMYCIN HYDROCHLORIDE 1000 MG: 1 INJECTION, POWDER, LYOPHILIZED, FOR SOLUTION INTRAVENOUS at 23:19

## 2019-10-14 RX ADMIN — ALBUMIN (HUMAN) 250 ML: 2.5 SOLUTION INTRAVENOUS at 14:46

## 2019-10-14 RX ADMIN — LIDOCAINE HYDROCHLORIDE 60 MG: 20 INJECTION, SOLUTION EPIDURAL; INFILTRATION; INTRACAUDAL; PERINEURAL at 13:19

## 2019-10-14 RX ADMIN — ROCURONIUM BROMIDE 50 MG: 10 INJECTION INTRAVENOUS at 13:19

## 2019-10-14 RX ADMIN — NEOSTIGMINE METHYLSULFATE 3 MG: 1 INJECTION INTRAVENOUS at 15:04

## 2019-10-14 RX ADMIN — SENNOSIDES AND DOCUSATE SODIUM 1 TABLET: 8.6; 5 TABLET ORAL at 18:22

## 2019-10-14 RX ADMIN — TOPIRAMATE 100 MG: 100 TABLET, FILM COATED ORAL at 21:33

## 2019-10-14 RX ADMIN — GLYCOPYRROLATE 0.4 MG: 0.2 INJECTION, SOLUTION INTRAMUSCULAR; INTRAVENOUS at 15:04

## 2019-10-14 RX ADMIN — ACETAMINOPHEN 1000 MG: 500 TABLET ORAL at 23:20

## 2019-10-14 RX ADMIN — Medication 10 MG: at 14:10

## 2019-10-14 RX ADMIN — FENTANYL CITRATE 25 MCG: 50 INJECTION INTRAMUSCULAR; INTRAVENOUS at 15:42

## 2019-10-14 RX ADMIN — HYDROMORPHONE HYDROCHLORIDE 2 MG: 2 TABLET ORAL at 21:32

## 2019-10-14 RX ADMIN — PREGABALIN 150 MG: 75 CAPSULE ORAL at 11:16

## 2019-10-14 NOTE — PERIOP NOTES
Handoff Report from Operating Room to PACU    Report received from Smitha Joseph RN and Karol Aguirre CRNA regarding [facility]. Surgeon(s):  MD Oscar Martinez MD  And Procedure(s) (LRB):  L4-5 LATERAL FUSION (N/A)  confirmed   with allergies, drains and dressings discussed. Anesthesia type, drugs, patient history, complications, estimated blood loss, vital signs, intake and output, and last pain medication, lines, reversal medications and temperature were reviewed.

## 2019-10-14 NOTE — PERIOP NOTES
Family updated. Aware of awaiting room assignment. No questions at this time. Will update as able. Patient currently resting comfortably/sleeping. VS as noted.

## 2019-10-14 NOTE — DISCHARGE INSTRUCTIONS
[address] [facility]    Discharge Instruction Sheet: POSTERIOR SPINAL FUSION    DR. Steve Sullivan    Pain control:   Typically, we will prescribe a narcotic usually 1-2 tabs every four hours is    sufficient for the pain. Most patients need this only for the first few weeks. You   should discontinue this as the pain decreases. You should not drive while taking any narcotic pain medications. Constipation  Pain medicines and anesthesia can be constipating-this can be prevented by gentle physical activity and drinking plenty of fluid. It should be treated with over-the-counter medications such as Miralax or suppositories, and/or Fleets enema. You should have a bowel movement at least every other day following surgery. Incision care     Keep this area clean and dry. Your dressing is designed to stay in place for 5-7 days. You will be sent home with one additional dressing to change at that time. Leave this new dressing in place until our follow up visit in the office in about 10-14 days. If staples are in place, they should be removed about 14-20 days after surgery. You may shower with this impermeable dressing in place. DO NOT take a tub bath or go swimming until cleared by your doctor. DO NOT apply lotions, oils, or creams to incision. To increase and promote healing:   Stop Smoking (or at least cut back on smoking).  Eat a well-balanced diet (high in protein and vitamin C)   If your appetite is poor, consider nutritional supplements like Ensure, Glucerna, or [place] Instant Breakfast.   If you are diabetic, controlling you blood sugars is very important to prevent infection and promote wound healing. Nutrition:   If you were on a supplement such as Ensure or Glucerna) while in the hospital, please continue using them with each meal for the next 30 days.    Eat a well-balanced diet - High in protein, high in vitamins and minerals, especially vitamin C and zinc.     Restrictions:   Remember your \"BLT's\"    1. Limited bending at waist    2. Lift no more than 10 pounds    3. No Twisting     If you were given a brace, wear it when out of bed. Warning signs : Please call your physician immediately at [phone] if you have   Bleeding from incision that is constant.  Change in mental status (unusual behavior or confusion)   If your incision develops redness or swelling   Change in wound drainage (increase in amount, color, or foul odor)   [place] over 101.5 degrees Fahrenheit    Headache that is not relieved with pain medication   Tenderness or redness in the calf of your leg    Emergency: CALL 911 if you have   Shortness of breath   Chest pain   Localized chest pain when coughing or taking a deep breath    Follow-up  Please call Dr. Coy Stout office for a follow up appointment in 2 weeks at [phone]. You can return to work when cleared by a physician. During normal business hours you may reach Dr. Domingo Trotter' team directly at [phone] if you have concerns or questions.     [facility]

## 2019-10-14 NOTE — ANESTHESIA PREPROCEDURE EVALUATION
Relevant Problems   No relevant active problems       Anesthetic History   No history of anesthetic complications            Review of Systems / Medical History  Patient summary reviewed, nursing notes reviewed and pertinent labs reviewed    Pulmonary    COPD               Neuro/Psych         Headaches and psychiatric history     Cardiovascular    Hypertension          CAD    Exercise tolerance: >4 METS  Comments: Mild to mod AI     GI/Hepatic/Renal     GERD      PUD     Endo/Other  Within defined limits           Other Findings            Physical Exam    Airway  Mallampati: II  TM Distance: 4 - 6 cm  Neck ROM: normal range of motion   Mouth opening: Normal     Cardiovascular          Murmur: Grade 3, Aortic area     Dental    Dentition: Full upper dentures and Full lower dentures     Pulmonary  Breath sounds clear to auscultation               Abdominal  GI exam deferred       Other Findings            Anesthetic Plan    ASA: 3  Anesthesia type: general            Anesthetic plan and risks discussed with: Patient      Cleared by cardiology

## 2019-10-14 NOTE — PROGRESS NOTES
Unable to perform L-Spine MAZOR scan at 1700hrs due to scanner being down. Primary RN asked me to call nursing supervisor due to patient getting surgery tomorrow. Nursing supervisor Venus Campuzano) aware and advised will call when I know something about the scanner.

## 2019-10-14 NOTE — H&P
Progress notes        Subjective:      Patient ID: Tanisha Montoya is a 71 y.o. female.     Chief Complaint: Pain of the Lower Back        HPI:  Tanisha Montoya is a 71 y.o. female following up after R L4-L5 and L5-S1 ESIs. She experienced worsening pain following the injections. She is experiencing low back pain radiating down the posterior RLE to the 5th toe and the entire lower left leg to the ankle. She denies symptoms in the anterior thighs, left thigh, left foot, or right big toe. Previously she had 3/5 bilateral hip flexors, quads, TA, and EHL weaknesses. The pain interferes with her ability to walk short distances and causes her to fall. She works on an [employer] and takes cymbalta and 100 mg gabapentin TID. It is rated 10 out of 10 on the VAS.         Patient Active Problem List     Diagnosis Date Noted    Intractable chronic migraine without aura and without status migrainosus 12/13/2017    Vitamin D deficiency 07/10/2017    Anxiety 03/28/2017    Benign hypertension 03/28/2017    Chronic obstructive lung disease 03/28/2017    Gastroesophageal reflux disease 03/28/2017    Hyperlipidemia 03/28/2017    Migraine 03/28/2017            Current Outpatient Medications:     acetaminophen (TYLENOL) 325 MG tablet, Take 650 mg by mouth every 6 (six) hours as needed, Disp: , Rfl:     albuterol (2.5 MG/3ML) 0.083% nebulizer solution, 3 mL every 8 hours. , Disp: , Rfl:     albuterol HFA (PROVENTIL HFA;VENTOLIN HFA) 108 (90 Base) MCG/ACT inhaler, every 4 hours. , Disp: , Rfl:     albuterol HFA (PROVENTIL HFA;VENTOLIN HFA) 108 (90 Base) MCG/ACT inhaler, Take 1 puff by mouth 2 (two) times a day, Disp: , Rfl:     ALPRAZolam (XANAX) 1 MG tablet, , Disp: , Rfl:     atorvastatin (LIPITOR) 20 MG tablet, , Disp: , Rfl:     baclofen (LIORESAL) 10 MG tablet, Take 1 tablet by mouth every 6 (six) hours as needed, Disp: , Rfl:     Biotin 1 MG capsule, Take by mouth, Disp: , Rfl:     Cholecalciferol (D3-1000) 1000 units capsule, daily. , Disp: , Rfl:     CoenzymeQ10-Isoleucine-Glycine (CO Q-10) 100-50-25 MG tablet sustained-release 24 hour, Co Q-10  2 capsules 1-2 times daily, Disp: , Rfl:     diphenhydrAMINE (BENADRYL ALLERGY) 25 MG capsule, Benadryl 25 mg capsule  Take 1 capsule every 6-8 hours by oral route., Disp: , Rfl:     DULoxetine (CYMBALTA) 30 MG capsule, Take 1 capsule (30 mg total) by mouth daily Take 7 days of 30 mg as a loading dose before switching to 60 mg daily, Disp: 7 capsule, Rfl: 0    DULoxetine (CYMBALTA) 60 MG capsule, Take 1 capsule (60 mg total) by mouth daily, Disp: 30 capsule, Rfl: 11    fluticasone-salmeterol (ADVAIR DISKUS) 250-50 MCG/DOSE diskus inhaler, every 12 hours. , Disp: , Rfl:     gabapentin (NEURONTIN) 100 MG capsule, Take 1 capsule (100 mg total) by mouth See admin instructions 1 daily for 3 days, then 1 bid for 3 days, then 1 tid, Disp: 90 capsule, Rfl: 2    melatonin tablet, daily. , Disp: , Rfl:     promethazine (PHENERGAN) 25 MG tablet, , Disp: , Rfl:     topiramate (TOPAMAX) 50 MG tablet, , Disp: , Rfl:            Allergies   Allergen Reactions    Aspirin Other (see comments) and GI intolerance       Abdominal pain stomach burning    Butorphanol Other (see comments)       Makes pain worse    Codeine Nausea And Vomiting and GI intolerance    Conjugated Estrogens Palpitations and Other (see comments)       Chest pain  Other reaction(s): Chest pain    Ibuprofen Anaphylaxis and Swelling    Penicillins Anaphylaxis and Other (see comments)       Makes her feel ill or worse    Sumatriptan Anaphylaxis    Nsaids         Swelling of extremities    Statins         Muscle pain         ROS:   No new bowel or bladder incontinence. No fever. No saddle anesthesia.     Objective:          Vitals:     07/01/19 0825   BP: (!) 160/73         Body mass index is 24.44 kg/m². , a BMI over 30 is considered obese and a BMI over 40 has been associated with a higher risk of surgical complications.     Constitutional: No acute distress. Well nourished. HEENT: Normocephalic. Respiratory:  No labored breathing. Cardiovascular:  No marked cyanosis. Skin:  No marked skin ulcers/lesions on bilateral upper or lower extremities. Psychiatric: Alert and oriented x3. Inspection: No gross deformity of bilateral upper or lower extremities. Musculoskeletal/Neurological:   Gait/balance:  - Slow, unsteady, labored  Thoracolumbar spine:  - No tenderness to palpation  - Full range of motion. Right lower extremity:  - No tenderness to palpation   - Full range of motion  - No pain with internal/external rotation of the hip  - Strength:  - 3 out of 5 to hip flexors  - 3 out of 5 to quads  - 3 out of 5 to TA  - 3 out of 5 to EHL  - 3 out of 5 to Gastroc/Soleus  - Negative straight leg raise  Left lower extremity:  - No tenderness to palpation   - Full range of motion  - No pain with internal/external rotation of the hip  - Strength:  - 3 out of 5 to hip flexors  - 3 out of 5 to quads  - 3 out of 5 to TA  - 3 out of 5 to EHL  - 3 out of 5 to Gastroc/Soleus  - Negative straight leg raise  Sensation:  - Intact to light touch  Reflexes:  - +2 Patellar tendon   - +2 Achilles tendon         Radiographs:           No imaging obtained      Assessment:          ICD-10-CM   1. Bilateral leg pain M79.604     M79.605   2. Lumbosacral spondylosis without myelopathy M47.817   3. Acquired spondylolisthesis M43.10   4. Spinal stenosis, lumbar region, with neurogenic claudication M48.062   5. Foraminal stenosis of lumbar region M99.83   6. Weakness of limb R29.898         Plan:      Ms. Gemini Lees experienced worsening pain following the injections and has diffuse 3/5 BLE weakness significantly impacting her ambulatory ability. The recent MRI details stenosis and a significant spondylolisthesis at L4-L5, which does not fully explain her symptoms but can potentially be corrected to provide some relief.  I scheduled a staged L4-L5 posterior/lateral decompression and fusion: day 1 is an L4-L5 lateral fusion, day 2, if needed, is a an L4-L5 posterior decompression and fusion. She understands this may not fully correct the weakness or pain and wishes to proceed. She must have cardiac clearance prior to surgery.     I have discussed the procedure in detail with the patient and mentioned complications, including but not limited to: death, permanent disability, heart attack, stroke, lung injury or infection, blindness, ileus, bladder or bowel problems, ureter injury, bleeding, nerve injury (including numbness, pain and weakness), paralysis (which may be permanent), failure to heal, failure to fuse bone together in fusion procedures, failure to relief symptoms, failure to relief pain, increased pain, need for further surgeries, failure or breakage or hardware, malpositioning of hardware, need to fuse or operate on additional levels determined either during or after surgery, destabilization of the spine (which may require fusion or later surgery), infections (which may or may not require additional surgery), dural tears (tears of the sac holding in nerves and spinal fluid), meningitis, voice changes, vocal cord injury, hoarseness, blood clots, pulmonary embolus, Renetta syndrome, recurrent disc herniation, diaphragm paralysis, and anesthetic complications. Comorbidities such as obesity, smoking, rheumatoid arthritis, chronic steroid use and diabetes increase these risks. The patient understands and wants to proceed.      The patient has been prescribed a LSO spinal orthosis pre-operatively for pain relief. The orthosis is medically necessary to reduce pain by restricting mobility of the trunk and to otherwise support weak spinal muscles and/or deformed spine. The patient will meet with our bracing coordinator to be fit for the brace.  Follow up 2 weeks after surgery.          Orders Placed This Encounter    Surgical Posting Sheet    Surgical Posting Sheet      No follow-ups on file.         Charting performed by Magan Mccauley in the presence of Kennieth Scheuermann, MD.  Anna Peñaloza MD, personally performed the services described in this documentation, as recorded by the scribe in my presence and it accurately and completely records my words and actions.     This note has been transcribed electronically using voice recognition and a trained scribe. It is believed to be accurate, but may contain errors secondary to technological limitations and other factors.

## 2019-10-14 NOTE — ANESTHESIA POSTPROCEDURE EVALUATION
Procedure(s):  L4-5 LATERAL FUSION. general    Anesthesia Post Evaluation        Patient location during evaluation: PACU  Note status: Adequate. Level of consciousness: responsive to verbal stimuli and sleepy but conscious  Pain management: satisfactory to patient  Airway patency: patent  Anesthetic complications: no  Cardiovascular status: acceptable  Respiratory status: acceptable  Hydration status: acceptable  Comments: +Post-Anesthesia Evaluation and Assessment    Patient: Jude An MRN: [id number]  SSN: [id number]   YOB: 1955  Age: 71 y.o. Sex: female      Cardiovascular Function/Vital Signs    /46 (BP 1 Location: Left arm, BP Patient Position: At rest)   Pulse (!) 53   Temp 36.4 °C (97.6 °F)   Resp 10   Ht 4' 9.75\" (1.467 m)   Wt 55.2 kg (121 lb 11.1 oz)   SpO2 99%   BMI 25.65 kg/m²     Patient is status post Procedure(s):  L4-5 LATERAL FUSION. Nausea/Vomiting: Controlled. Postoperative hydration reviewed and adequate. Pain:  Pain Scale 1: FLACC (10/14/19 1605)  Pain Intensity 1: 7 (10/14/19 1555)   Managed. Neurological Status:   Neuro (WDL): Exceptions to WDL (10/14/19 1530)   At baseline. Mental Status and Level of Consciousness: Arousable. Pulmonary Status:   O2 Device: Nasal cannula (10/14/19 1615)   Adequate oxygenation and airway patent. Complications related to anesthesia: None    Post-anesthesia assessment completed. No concerns. Signed By: Anmol Lindsay MD    10/14/2019  Post anesthesia nausea and vomiting:  controlled      Vitals Value Taken Time   /51 10/14/2019  4:05 PM   Temp 36.4 °C (97.6 °F) 10/14/2019  3:31 PM   Pulse 53 10/14/2019  4:15 PM   Resp 10 10/14/2019  4:15 PM   SpO2 99 % 10/14/2019  4:15 PM   Vitals shown include unvalidated device data.

## 2019-10-14 NOTE — BRIEF OP NOTE
BRIEF OPERATIVE NOTE    Date of Procedure: 10/14/2019   Preoperative Diagnosis: BILATERAL LEG PAIN, LUMBOSACRAL SPONDYLOSIS WITHOUT MYELOPATHY, ACQUIRED SPONDYLOLISTHESIS, FORAMINAL STENOSIS OF LUMBAR REGION, WEAKNESS OF LIMB  Postoperative Diagnosis: BILATERAL LEG PAIN, LUMBOSACRAL SPONDYLOSIS WITHOUT MYELOPATHY, ACQUIRED SPONDYLOLISTHESIS, FORAMINAL STENOSIS OF LUMBAR REGION, WEAKNESS OF LIMB    Procedure(s):  L4-5 LATERAL FUSION  Surgeon(s) and Role:     Ferny Graham MD - Primary         Surgical Assistant: Dwight Branham    Surgical Staff:  Circ-1: Leah Murphy RN  Circ-Relief: Stephanie Leung  Circ-Intern: Maite Guy RN  Physician Assistant: BRITTNI Lancaster  Scrub Tech-1: Dru Maza  Scrub Tech-Relief: Buffy SIMPSON  Event Time In Time Out   Incision Start 1406    Incision Close       Anesthesia: General   Estimated Blood Loss: 200cc  Specimens: * No specimens in log *   Findings: Stenosis   Complications: None  Implants:   Implant Name Type Inv.  Item Serial No.  Lot No. LRB No. Used Action   OSTEOAVIAP SELECT Fibers (5cc)   [id number] [employer] N/A N/A 1 Implanted   SPACER EXP [id number] 7MM 3-15D -- RISE-L - SN/A  SPACER EXP [id number] 7MM 3-15D -- RISE-L N/A [employer] TRAY N/A 1 Implanted   PLATE THORLUM 03QD -- [place] - SN/A  PLATE THORLUM 12XD -- [place] N/A [employer] TRAY N/A 1 Implanted   SCR BNE VA 5.5X26MM -- [place] - SN/A  SCR BNE VA 5.5X26MM -- [place] N/A [employer] TRAY N/A 2 Implanted

## 2019-10-14 NOTE — PERIOP NOTES
Patient reports that her daily pain level is 7+/10. Discussed pain management in PACU. After pain medication administration patient resting with eyes closed, RR decreased, HR decreased. Will continue to monitor.

## 2019-10-14 NOTE — ROUTINE PROCESS
TRANSFER - OUT REPORT: 
 
Verbal report given to Deanna RN(name) on Jude An  being transferred to [facility](unit) for routine progression of care Report consisted of patients Situation, Background, Assessment and  
Recommendations(SBAR). Information from the following report(s) OR Summary, Intake/Output and MAR was reviewed with the receiving nurse. Opportunity for questions and clarification was provided. Patient transported with: 
 O2 @ 2 liters Tech

## 2019-10-15 ENCOUNTER — APPOINTMENT (OUTPATIENT)
Dept: GENERAL RADIOLOGY | Age: 64
DRG: 455 | End: 2019-10-15
Attending: ORTHOPAEDIC SURGERY
Payer: COMMERCIAL

## 2019-10-15 ENCOUNTER — ANESTHESIA EVENT (OUTPATIENT)
Dept: SURGERY | Age: 64
DRG: 455 | End: 2019-10-15
Payer: COMMERCIAL

## 2019-10-15 ENCOUNTER — APPOINTMENT (OUTPATIENT)
Dept: CT IMAGING | Age: 64
DRG: 455 | End: 2019-10-15
Attending: ORTHOPAEDIC SURGERY
Payer: COMMERCIAL

## 2019-10-15 ENCOUNTER — ANESTHESIA (OUTPATIENT)
Dept: SURGERY | Age: 64
DRG: 455 | End: 2019-10-15
Payer: COMMERCIAL

## 2019-10-15 LAB — HGB BLD-MCNC: 9.2 G/DL (ref 11.5–16)

## 2019-10-15 PROCEDURE — 77030003028 HC SUT VCRL J&J -A: Performed by: ORTHOPAEDIC SURGERY

## 2019-10-15 PROCEDURE — 77030008684 HC TU ET CUF COVD -B: Performed by: NURSE ANESTHETIST, CERTIFIED REGISTERED

## 2019-10-15 PROCEDURE — C1713 ANCHOR/SCREW BN/BN,TIS/BN: HCPCS | Performed by: ORTHOPAEDIC SURGERY

## 2019-10-15 PROCEDURE — 72100 X-RAY EXAM L-S SPINE 2/3 VWS: CPT

## 2019-10-15 PROCEDURE — 77030035129: Performed by: ORTHOPAEDIC SURGERY

## 2019-10-15 PROCEDURE — 77030026438 HC STYL ET INTUB CARD -A: Performed by: NURSE ANESTHETIST, CERTIFIED REGISTERED

## 2019-10-15 PROCEDURE — 77030029099 HC BN WAX SSPC -A: Performed by: ORTHOPAEDIC SURGERY

## 2019-10-15 PROCEDURE — 77030014647 HC SEAL FBRN TISSL BAXT -D: Performed by: ORTHOPAEDIC SURGERY

## 2019-10-15 PROCEDURE — 77030002933 HC SUT MCRYL J&J -A: Performed by: ORTHOPAEDIC SURGERY

## 2019-10-15 PROCEDURE — 74011250636 HC RX REV CODE- 250/636: Performed by: ANESTHESIOLOGY

## 2019-10-15 PROCEDURE — 74011000250 HC RX REV CODE- 250: Performed by: ORTHOPAEDIC SURGERY

## 2019-10-15 PROCEDURE — 76010000171 HC OR TIME 2 TO 2.5 HR INTENSV-TIER 1: Performed by: ORTHOPAEDIC SURGERY

## 2019-10-15 PROCEDURE — 76000 FLUOROSCOPY <1 HR PHYS/QHP: CPT

## 2019-10-15 PROCEDURE — 76210000016 HC OR PH I REC 1 TO 1.5 HR: Performed by: ORTHOPAEDIC SURGERY

## 2019-10-15 PROCEDURE — 77030040356 HC CORD BPLR FRCP COVD -A: Performed by: ORTHOPAEDIC SURGERY

## 2019-10-15 PROCEDURE — 77030018723 HC ELCTRD BLD COVD -A: Performed by: ORTHOPAEDIC SURGERY

## 2019-10-15 PROCEDURE — 07DR0ZZ EXTRACTION OF ILIAC BONE MARROW, OPEN APPROACH: ICD-10-PCS | Performed by: ORTHOPAEDIC SURGERY

## 2019-10-15 PROCEDURE — 77030003029 HC SUT VCRL J&J -B: Performed by: ORTHOPAEDIC SURGERY

## 2019-10-15 PROCEDURE — 97165 OT EVAL LOW COMPLEX 30 MIN: CPT

## 2019-10-15 PROCEDURE — 74011250637 HC RX REV CODE- 250/637: Performed by: ORTHOPAEDIC SURGERY

## 2019-10-15 PROCEDURE — 77030002934 HC SUT MCRYL J&J -B: Performed by: ORTHOPAEDIC SURGERY

## 2019-10-15 PROCEDURE — 74011250636 HC RX REV CODE- 250/636: Performed by: ORTHOPAEDIC SURGERY

## 2019-10-15 PROCEDURE — 77030018836 HC SOL IRR NACL ICUM -A: Performed by: ORTHOPAEDIC SURGERY

## 2019-10-15 PROCEDURE — 36415 COLL VENOUS BLD VENIPUNCTURE: CPT

## 2019-10-15 PROCEDURE — 77030018846 HC SOL IRR STRL H20 ICUM -A: Performed by: ORTHOPAEDIC SURGERY

## 2019-10-15 PROCEDURE — 85018 HEMOGLOBIN: CPT

## 2019-10-15 PROCEDURE — 8E0W0CZ ROBOTIC ASSISTED PROCEDURE OF TRUNK REGION, OPEN APPROACH: ICD-10-PCS | Performed by: ORTHOPAEDIC SURGERY

## 2019-10-15 PROCEDURE — 74011000250 HC RX REV CODE- 250: Performed by: NURSE ANESTHETIST, CERTIFIED REGISTERED

## 2019-10-15 PROCEDURE — 74011250636 HC RX REV CODE- 250/636: Performed by: NURSE ANESTHETIST, CERTIFIED REGISTERED

## 2019-10-15 PROCEDURE — 77030012961 HC IRR KT CYSTO/TUR ICUM -A: Performed by: ORTHOPAEDIC SURGERY

## 2019-10-15 PROCEDURE — 0SG0071 FUSION OF LUMBAR VERTEBRAL JOINT WITH AUTOLOGOUS TISSUE SUBSTITUTE, POSTERIOR APPROACH, POSTERIOR COLUMN, OPEN APPROACH: ICD-10-PCS | Performed by: ORTHOPAEDIC SURGERY

## 2019-10-15 PROCEDURE — 76060000035 HC ANESTHESIA 2 TO 2.5 HR: Performed by: ORTHOPAEDIC SURGERY

## 2019-10-15 PROCEDURE — 77030008462 HC STPLR SKN PROX J&J -A: Performed by: ORTHOPAEDIC SURGERY

## 2019-10-15 PROCEDURE — 77030020268 HC MISC GENERAL SUPPLY: Performed by: ORTHOPAEDIC SURGERY

## 2019-10-15 PROCEDURE — 77030020263 HC SOL INJ SOD CL0.9% LFCR 1000ML: Performed by: ORTHOPAEDIC SURGERY

## 2019-10-15 PROCEDURE — 77030003666 HC NDL SPINAL BD -A: Performed by: ORTHOPAEDIC SURGERY

## 2019-10-15 PROCEDURE — 97116 GAIT TRAINING THERAPY: CPT

## 2019-10-15 PROCEDURE — 77030039267 HC ADH SKN EXOFIN S2SG -B: Performed by: ORTHOPAEDIC SURGERY

## 2019-10-15 PROCEDURE — 77030004391 HC BUR FLUT MEDT -C: Performed by: ORTHOPAEDIC SURGERY

## 2019-10-15 PROCEDURE — 94760 N-INVAS EAR/PLS OXIMETRY 1: CPT

## 2019-10-15 PROCEDURE — 77030037058 HC GRFT BN PTTY OSTEOAMP BTUS -F: Performed by: ORTHOPAEDIC SURGERY

## 2019-10-15 PROCEDURE — 77030022704 HC SUT VLOC COVD -B: Performed by: ORTHOPAEDIC SURGERY

## 2019-10-15 PROCEDURE — 97161 PT EVAL LOW COMPLEX 20 MIN: CPT

## 2019-10-15 PROCEDURE — 97535 SELF CARE MNGMENT TRAINING: CPT

## 2019-10-15 PROCEDURE — 97530 THERAPEUTIC ACTIVITIES: CPT

## 2019-10-15 PROCEDURE — 65270000029 HC RM PRIVATE

## 2019-10-15 PROCEDURE — 74011250637 HC RX REV CODE- 250/637: Performed by: NURSE PRACTITIONER

## 2019-10-15 PROCEDURE — 72131 CT LUMBAR SPINE W/O DYE: CPT

## 2019-10-15 DEVICE — 6.5 X 40MM CANNULATED SCREW, MODULAR, CREO AMP
Type: IMPLANTABLE DEVICE | Site: SPINE LUMBAR | Status: FUNCTIONAL
Brand: CREO

## 2019-10-15 DEVICE — IMPLANTABLE DEVICE: Type: IMPLANTABLE DEVICE | Site: SPINE LUMBAR | Status: FUNCTIONAL

## 2019-10-15 DEVICE — 6.5 X 45MM CANNULATED SCREW, MODULAR, CREO AMP
Type: IMPLANTABLE DEVICE | Site: SPINE LUMBAR | Status: FUNCTIONAL
Brand: CREO

## 2019-10-15 DEVICE — 6.5 X 30MM CANNULATED SCREW, MODULAR, CREO AMP
Type: IMPLANTABLE DEVICE | Site: SPINE LUMBAR | Status: FUNCTIONAL
Brand: CREO

## 2019-10-15 DEVICE — CREO MIS MODULAR POLYAXIAL TULIP, 30MM REDUCTION
Type: IMPLANTABLE DEVICE | Site: SPINE LUMBAR | Status: FUNCTIONAL
Brand: CREO

## 2019-10-15 RX ORDER — MORPHINE SULFATE 10 MG/ML
2 INJECTION, SOLUTION INTRAMUSCULAR; INTRAVENOUS
Status: DISCONTINUED | OUTPATIENT
Start: 2019-10-15 | End: 2019-10-15 | Stop reason: HOSPADM

## 2019-10-15 RX ORDER — AMOXICILLIN 250 MG
1 CAPSULE ORAL 2 TIMES DAILY
Qty: 30 TAB | Refills: 0 | Status: SHIPPED | OUTPATIENT
Start: 2019-10-15 | End: 2019-10-30

## 2019-10-15 RX ORDER — LIDOCAINE HYDROCHLORIDE 10 MG/ML
0.1 INJECTION, SOLUTION EPIDURAL; INFILTRATION; INTRACAUDAL; PERINEURAL AS NEEDED
Status: DISCONTINUED | OUTPATIENT
Start: 2019-10-15 | End: 2019-10-15 | Stop reason: HOSPADM

## 2019-10-15 RX ORDER — LIDOCAINE HYDROCHLORIDE 20 MG/ML
INJECTION, SOLUTION EPIDURAL; INFILTRATION; INTRACAUDAL; PERINEURAL AS NEEDED
Status: DISCONTINUED | OUTPATIENT
Start: 2019-10-15 | End: 2019-10-15 | Stop reason: HOSPADM

## 2019-10-15 RX ORDER — MIDAZOLAM HYDROCHLORIDE 1 MG/ML
INJECTION, SOLUTION INTRAMUSCULAR; INTRAVENOUS AS NEEDED
Status: DISCONTINUED | OUTPATIENT
Start: 2019-10-15 | End: 2019-10-15 | Stop reason: HOSPADM

## 2019-10-15 RX ORDER — HYDROMORPHONE HYDROCHLORIDE 2 MG/1
4 TABLET ORAL
Status: DISCONTINUED | OUTPATIENT
Start: 2019-10-15 | End: 2019-10-16 | Stop reason: HOSPADM

## 2019-10-15 RX ORDER — HYDROMORPHONE HYDROCHLORIDE 2 MG/1
2 TABLET ORAL
Status: DISCONTINUED | OUTPATIENT
Start: 2019-10-15 | End: 2019-10-16 | Stop reason: HOSPADM

## 2019-10-15 RX ORDER — POLYETHYLENE GLYCOL 3350 17 G/17G
17 POWDER, FOR SOLUTION ORAL DAILY
Qty: 15 PACKET | Refills: 0 | Status: SHIPPED | OUTPATIENT
Start: 2019-10-16 | End: 2019-10-31

## 2019-10-15 RX ORDER — PROPOFOL 10 MG/ML
INJECTION, EMULSION INTRAVENOUS AS NEEDED
Status: DISCONTINUED | OUTPATIENT
Start: 2019-10-15 | End: 2019-10-15 | Stop reason: HOSPADM

## 2019-10-15 RX ORDER — MIDAZOLAM HYDROCHLORIDE 1 MG/ML
1 INJECTION, SOLUTION INTRAMUSCULAR; INTRAVENOUS AS NEEDED
Status: DISCONTINUED | OUTPATIENT
Start: 2019-10-15 | End: 2019-10-15 | Stop reason: HOSPADM

## 2019-10-15 RX ORDER — NALOXONE HYDROCHLORIDE 4 MG/.1ML
SPRAY NASAL
Qty: 1 EACH | Refills: 0 | Status: SHIPPED | OUTPATIENT
Start: 2019-10-15

## 2019-10-15 RX ORDER — PHENYLEPHRINE HCL IN 0.9% NACL 0.4MG/10ML
SYRINGE (ML) INTRAVENOUS AS NEEDED
Status: DISCONTINUED | OUTPATIENT
Start: 2019-10-15 | End: 2019-10-15 | Stop reason: HOSPADM

## 2019-10-15 RX ORDER — FENTANYL CITRATE 50 UG/ML
25 INJECTION, SOLUTION INTRAMUSCULAR; INTRAVENOUS
Status: DISCONTINUED | OUTPATIENT
Start: 2019-10-15 | End: 2019-10-15 | Stop reason: HOSPADM

## 2019-10-15 RX ORDER — HYDROMORPHONE HYDROCHLORIDE 2 MG/ML
INJECTION, SOLUTION INTRAMUSCULAR; INTRAVENOUS; SUBCUTANEOUS AS NEEDED
Status: DISCONTINUED | OUTPATIENT
Start: 2019-10-15 | End: 2019-10-15 | Stop reason: HOSPADM

## 2019-10-15 RX ORDER — ONDANSETRON 2 MG/ML
INJECTION INTRAMUSCULAR; INTRAVENOUS AS NEEDED
Status: DISCONTINUED | OUTPATIENT
Start: 2019-10-15 | End: 2019-10-15 | Stop reason: HOSPADM

## 2019-10-15 RX ORDER — FENTANYL CITRATE 50 UG/ML
50 INJECTION, SOLUTION INTRAMUSCULAR; INTRAVENOUS AS NEEDED
Status: DISCONTINUED | OUTPATIENT
Start: 2019-10-15 | End: 2019-10-15 | Stop reason: HOSPADM

## 2019-10-15 RX ORDER — HYDROMORPHONE HYDROCHLORIDE 2 MG/1
2 TABLET ORAL
Qty: 60 TAB | Refills: 0 | Status: SHIPPED | OUTPATIENT
Start: 2019-10-15 | End: 2019-10-29

## 2019-10-15 RX ORDER — SODIUM CHLORIDE, SODIUM LACTATE, POTASSIUM CHLORIDE, CALCIUM CHLORIDE 600; 310; 30; 20 MG/100ML; MG/100ML; MG/100ML; MG/100ML
25 INJECTION, SOLUTION INTRAVENOUS CONTINUOUS
Status: DISCONTINUED | OUTPATIENT
Start: 2019-10-15 | End: 2019-10-15 | Stop reason: HOSPADM

## 2019-10-15 RX ORDER — SUCCINYLCHOLINE CHLORIDE 20 MG/ML
INJECTION INTRAMUSCULAR; INTRAVENOUS AS NEEDED
Status: DISCONTINUED | OUTPATIENT
Start: 2019-10-15 | End: 2019-10-15 | Stop reason: HOSPADM

## 2019-10-15 RX ORDER — FAMOTIDINE 20 MG/1
20 TABLET, FILM COATED ORAL DAILY
Qty: 30 TAB | Refills: 0 | Status: SHIPPED | OUTPATIENT
Start: 2019-10-16 | End: 2019-11-15

## 2019-10-15 RX ORDER — DEXAMETHASONE SODIUM PHOSPHATE 4 MG/ML
INJECTION, SOLUTION INTRA-ARTICULAR; INTRALESIONAL; INTRAMUSCULAR; INTRAVENOUS; SOFT TISSUE AS NEEDED
Status: DISCONTINUED | OUTPATIENT
Start: 2019-10-15 | End: 2019-10-15 | Stop reason: HOSPADM

## 2019-10-15 RX ORDER — ONDANSETRON 2 MG/ML
4 INJECTION INTRAMUSCULAR; INTRAVENOUS AS NEEDED
Status: DISCONTINUED | OUTPATIENT
Start: 2019-10-15 | End: 2019-10-15 | Stop reason: HOSPADM

## 2019-10-15 RX ORDER — ACETAMINOPHEN 500 MG
1000 TABLET ORAL EVERY 6 HOURS
Qty: 112 TAB | Refills: 0 | Status: SHIPPED | OUTPATIENT
Start: 2019-10-15 | End: 2019-10-29

## 2019-10-15 RX ORDER — FENTANYL CITRATE 50 UG/ML
INJECTION, SOLUTION INTRAMUSCULAR; INTRAVENOUS AS NEEDED
Status: DISCONTINUED | OUTPATIENT
Start: 2019-10-15 | End: 2019-10-15 | Stop reason: HOSPADM

## 2019-10-15 RX ORDER — EPHEDRINE SULFATE/0.9% NACL/PF 50 MG/5 ML
SYRINGE (ML) INTRAVENOUS AS NEEDED
Status: DISCONTINUED | OUTPATIENT
Start: 2019-10-15 | End: 2019-10-15 | Stop reason: HOSPADM

## 2019-10-15 RX ADMIN — HYDROMORPHONE HYDROCHLORIDE 2 MG: 2 TABLET ORAL at 17:45

## 2019-10-15 RX ADMIN — SENNOSIDES AND DOCUSATE SODIUM 1 TABLET: 8.6; 5 TABLET ORAL at 16:45

## 2019-10-15 RX ADMIN — PROPOFOL 150 MG: 10 INJECTION, EMULSION INTRAVENOUS at 07:39

## 2019-10-15 RX ADMIN — TOPIRAMATE 100 MG: 100 TABLET, FILM COATED ORAL at 16:45

## 2019-10-15 RX ADMIN — Medication 10 ML: at 05:26

## 2019-10-15 RX ADMIN — Medication 10 MG: at 08:04

## 2019-10-15 RX ADMIN — HYDROMORPHONE HYDROCHLORIDE 2 MG: 2 TABLET ORAL at 22:23

## 2019-10-15 RX ADMIN — METOPROLOL TARTRATE 25 MG: 25 TABLET ORAL at 06:18

## 2019-10-15 RX ADMIN — MIDAZOLAM HYDROCHLORIDE 2 MG: 1 INJECTION INTRAMUSCULAR; INTRAVENOUS at 07:35

## 2019-10-15 RX ADMIN — FENTANYL CITRATE 75 MCG: 50 INJECTION, SOLUTION INTRAMUSCULAR; INTRAVENOUS at 07:39

## 2019-10-15 RX ADMIN — HYDROMORPHONE HYDROCHLORIDE 0.5 MG: 2 INJECTION, SOLUTION INTRAMUSCULAR; INTRAVENOUS; SUBCUTANEOUS at 10:06

## 2019-10-15 RX ADMIN — HYDROMORPHONE HYDROCHLORIDE 2 MG: 2 TABLET ORAL at 13:05

## 2019-10-15 RX ADMIN — GABAPENTIN 100 MG: 100 CAPSULE ORAL at 22:21

## 2019-10-15 RX ADMIN — ALPRAZOLAM 0.5 MG: 0.5 TABLET ORAL at 22:21

## 2019-10-15 RX ADMIN — GABAPENTIN 100 MG: 100 CAPSULE ORAL at 16:44

## 2019-10-15 RX ADMIN — HYDROMORPHONE HYDROCHLORIDE 4 MG: 2 TABLET ORAL at 05:25

## 2019-10-15 RX ADMIN — HYDROMORPHONE HYDROCHLORIDE 4 MG: 2 TABLET ORAL at 02:20

## 2019-10-15 RX ADMIN — SODIUM CHLORIDE 125 ML/HR: 900 INJECTION, SOLUTION INTRAVENOUS at 10:55

## 2019-10-15 RX ADMIN — DEXAMETHASONE SODIUM PHOSPHATE 8 MG: 4 INJECTION, SOLUTION INTRAMUSCULAR; INTRAVENOUS at 08:05

## 2019-10-15 RX ADMIN — SODIUM CHLORIDE, POTASSIUM CHLORIDE, SODIUM LACTATE AND CALCIUM CHLORIDE: 600; 310; 30; 20 INJECTION, SOLUTION INTRAVENOUS at 07:20

## 2019-10-15 RX ADMIN — SUCCINYLCHOLINE CHLORIDE 100 MG: 20 INJECTION, SOLUTION INTRAMUSCULAR; INTRAVENOUS at 07:41

## 2019-10-15 RX ADMIN — FENTANYL CITRATE 25 MCG: 50 INJECTION INTRAMUSCULAR; INTRAVENOUS at 10:26

## 2019-10-15 RX ADMIN — HYDROMORPHONE HYDROCHLORIDE 0.6 MG: 2 INJECTION, SOLUTION INTRAMUSCULAR; INTRAVENOUS; SUBCUTANEOUS at 09:52

## 2019-10-15 RX ADMIN — FENTANYL CITRATE 25 MCG: 50 INJECTION, SOLUTION INTRAMUSCULAR; INTRAVENOUS at 07:35

## 2019-10-15 RX ADMIN — ONDANSETRON HYDROCHLORIDE 4 MG: 2 INJECTION, SOLUTION INTRAMUSCULAR; INTRAVENOUS at 07:35

## 2019-10-15 RX ADMIN — Medication 10 ML: at 22:22

## 2019-10-15 RX ADMIN — FENTANYL CITRATE 25 MCG: 50 INJECTION INTRAMUSCULAR; INTRAVENOUS at 10:40

## 2019-10-15 RX ADMIN — Medication 200 MCG: at 07:40

## 2019-10-15 RX ADMIN — ACETAMINOPHEN 1000 MG: 500 TABLET ORAL at 16:46

## 2019-10-15 RX ADMIN — ACETAMINOPHEN 1000 MG: 500 TABLET ORAL at 05:25

## 2019-10-15 RX ADMIN — Medication 10 ML: at 05:35

## 2019-10-15 RX ADMIN — ACETAMINOPHEN 1000 MG: 500 TABLET ORAL at 13:05

## 2019-10-15 RX ADMIN — LIDOCAINE HYDROCHLORIDE 100 MG: 20 INJECTION, SOLUTION INTRAVENOUS at 07:39

## 2019-10-15 RX ADMIN — HYDROMORPHONE HYDROCHLORIDE 0.4 MG: 2 INJECTION, SOLUTION INTRAMUSCULAR; INTRAVENOUS; SUBCUTANEOUS at 09:01

## 2019-10-15 RX ADMIN — DIAZEPAM 5 MG: 5 TABLET ORAL at 16:54

## 2019-10-15 RX ADMIN — FENTANYL CITRATE 25 MCG: 50 INJECTION INTRAMUSCULAR; INTRAVENOUS at 10:30

## 2019-10-15 NOTE — PERIOP NOTES
Handoff Report from Operating Room to PACU    Report received from Morgan Rodas RN and ANETTE Higgins CRNA regarding Uziel List. Surgeon(s):  Marcos Zacarias MD  And Procedure(s) (LRB):  L4-5 POSTERIOR FUSION (surg pain sol) (N/A)  confirmed   with allergies, dressings and local anesthetics discussed. Anesthesia type, drugs, patient history, complications, estimated blood loss, vital signs, intake and output, and last pain medication, lines and temperature were reviewed.

## 2019-10-15 NOTE — PROGRESS NOTES
Problem: Pressure Injury - Risk of  Goal: *Prevention of pressure injury  Description  Document Stevie Scale and appropriate interventions in the flowsheet.   Outcome: Progressing Towards Goal  Note:   Pressure Injury Interventions:

## 2019-10-15 NOTE — PERIOP NOTES
TRANSFER - OUT REPORT:    Verbal report given to Deanna RN(name) on Abdi Rocha  being transferred to [facility]) for routine post - op       Report consisted of patients Situation, Background, Assessment and   Recommendations(SBAR). Information from the following report(s) SBAR, OR Summary, Intake/Output and MAR was reviewed with the receiving nurse. Opportunity for questions and clarification was provided.       Patient transported with:   O2 @ 2 liters  Tech
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
## 2019-10-15 NOTE — PROGRESS NOTES
Physical Therapy    Orders received to evaluate, chart reviewed and patient in OR. Will defer and continue to follow.     Michelle Fletcher

## 2019-10-15 NOTE — PERIOP NOTES
Per Dr. Gina Blackmon and Radiology pt to have CT that was missed last night at this time. Pt being taken to CT with Rad Tech and SCT.

## 2019-10-15 NOTE — BRIEF OP NOTE
BRIEF OPERATIVE NOTE    Date of Procedure: 10/15/2019   Preoperative Diagnosis: BILATERAL LEG PAIN, LUMBOSACRAL SPONDYLOSIS WITHOUT MYELOPATHY, ACQUIRED SPONDYLOLISTHESIS, FORMINAL STENOSIS OF LUMBAR REGION, WEAKNESS OF LIMB  Postoperative Diagnosis: * No post-op diagnosis entered *    Procedure(s):  L4-5 POSTERIOR FUSION (surg pain sol)  Surgeon(s) and Role:     Guerita Eaton MD - Primary         Surgical Assistant: Cheikh Felder    Surgical Staff:  Circ-1: Cameron Kahn RN  Circ-Intern: Rufina Robledo  Scrub Tech-1: Jasmina Augustine  Surg Asst-1: Bridgett Leak  Event Time In Time Out   Incision Start 0830    Incision Close       Anesthesia: General   Estimated Blood Loss: 50cc  Specimens: * No specimens in log *   Findings: stenosis   Complications: none  Implants:   Implant Name Type Inv. Item Serial No.  Lot No. LRB No. Used Action   GRAFT BNE PTTY . 5ML -- OSTEOAMP - [id number]  GRAFT BNE PTTY . 5ML -- OSTEOAMP [id number] [facility] NA N/A 1 Implanted

## 2019-10-15 NOTE — OP NOTES
[address]  OPERATIVE REPORT    Name:  Andres Child  MR#:  [id number]  :  1955  ACCOUNT #:  [de-identified]  DATE OF SERVICE:  10/14/2019      PREOPERATIVE DIAGNOSES:  1. L4-5 spondylolisthesis. 2.  Spinal stenosis with claudication. 3.  Lumbago. 4.  Sciatica. POSTOPERATIVE DIAGNOSES:  1. L4-5 spondylolisthesis. 2.  Spinal stenosis with claudication. 3.  Lumbago. 4.  Sciatica. PROCEDURE PERFORMED:  1. L4-5 anterior fusion. 2.  L4-5 anterior instrumentation. 3. J2-5 application of interbody biomechanical device. 4.  Use of allograft bone for spine fusion. 5.  Bone marrow aspirate from the left posterior superior iliac spine for spinal fusion augmentation. SURGEON:  Yunier Fajardo MD    FIRST ASSISTANT:  BRITTNI Chawla    ANESTHESIA:  General.    COMPLICATIONS:  None. SPECIMENS REMOVED:  None. IMPLANTS:  Globus RISE-L interbody mechanical device and Globus anterior plate, the plate and the interbody biomechanical device with two separate devices, not one single device. ESTIMATED BLOOD LOSS:  200 mL. DRAINS:  None. INDICATIONS FOR PROCEDURE:  The patient is a 41-year-old lady with an L4-5 spondylolisthesis, lumbago and sciatica that has failed to improve with nonoperative treatment. At this point, she would like to proceed with surgical intervention. I have given her warnings about the possible complications including, but not limited to pain, scar, bleeding, infection, nonunion, damage to surrounding structures, death, paralysis, blindness, stroke. She understands and wants to proceed. NARRATIVE OF THE PROCEDURE:  After informed consent was obtained and the operative site was properly marked, the patient was moved back to operating room and underwent general endotracheal anesthesia. She was positioned on the lateral decubitus with the left side up using the regular OR bed. Her knees were gently bent with pillows.   Fluoroscopy was used to alfred the level of the incision after she was safely secured to the bed and all the bony extremities were well padded. I then proceeded to prep and drape in the usual manner followed by obtaining a time-out verifying that this is the correct patient, the correct surgery, the correct site as well as that she had received IV antibiotics within 30 minutes of the incision. I then proceeded to perform a standard anterior approach for an OLIF. I proceeded to split the abdominal musculature in layers and entered the retroperitoneal space. I was able then to develop the plane between the psoas muscle anteriorly and abdominal content posteriorly to find the spine. Once that was identified, I proceeded to dissect the area thoroughly and have the L4-5 disk exposed. A wire was then placed at L4-5 and identified the level. I then proceeded to use the self-retaining retractor and use a 15-blade to perform box annulotomy. The annulus was removed with pituitary and the superior and inferior cartilaginous endplates were detached with a Rosas. A box shaver was used to remove the remainder of the disk debris followed by a pituitary and a curette. A trial was then used to determine the appropriate size for the implant and while the implant was being packed, I performed a stab incision over the left posterior superior iliac spine, aspirated 60 mL of bone marrow and used that first to augment the allograft bone. The combination of allograft and bone marrow aspirate were then packed into the cage for the anterior fusion. I then proceeded to select my cage with allograft soaked in bone marrow aspirate inserted at the L4-5 level in the proper position. With the cage in place, I proceeded to distract it to its final height.   Once the cage was inserted at L4-5, distracted to its final height and corrected the lumbar spondylolisthesis and restored disk height at L4-5 and also proceeded to allow the allograft bone to be there for the anterior fusion. I proceeded then to select a plate, placed at L4 and L5 and drilled for a screw at L5 and another screw at L4. These screws were placed and final tightened to the plate and locked with a final locking device. Once this was completed, the wound was irrigated with normal saline. Hemostasis was obtained. I proceeded to close the abdominal musculature in layers with #1 Vicryl sutures followed by irrigating subcu, closed subcu with 2-0 Vicryl and the skin with 3-0 running Monocryl and Dermabond. Sterile dressing was applied. The patient was then awakened and transferred to PACU in stable condition. POSTOPERATIVE PLAN:  The patient is going to remain here overnight. We are going to give her SCDs and SJ hoses for DVT prophylaxis and Ancef for infection prophylaxis.       MD CROW [email or web address]  D:  10/14/2019 15:24  T:  10/15/2019 1:16  JOB #:  [id number]  CC:  Marcelo Us MD

## 2019-10-15 NOTE — PROGRESS NOTES
Orders received to evaluate, chart reviewed and patient in OR. Will defer and continue to follow.

## 2019-10-15 NOTE — PERIOP NOTES
TRANSFER - IN REPORT:    Verbal report received from [facility] on Jignesh Clement  being received from [phone] [id number] for ordered procedure      Report consisted of patients Situation, Background, Assessment and   Recommendations(SBAR). Information from the following report(s) SBAR, OR Summary, Procedure Summary, Intake/Output and MAR was reviewed with the receiving nurse. Opportunity for questions and clarification was provided. Assessment completed upon patients arrival to unit and care assumed.

## 2019-10-15 NOTE — ANESTHESIA POSTPROCEDURE EVALUATION
Post-Anesthesia Evaluation and Assessment    Patient: Tansiha oMntoya MRN: [id number]  SSN: [id number]    YOB: 1955  Age: 71 y.o. Sex: female      I have evaluated the patient and they are stable and ready for discharge from the PACU. Cardiovascular Function/Vital Signs  Visit Vitals  /45   Pulse 76   Temp 37 °C (98.6 °F)   Resp 13   Ht 4' 9.75\" (1.467 m)   Wt 55.2 kg (121 lb 11.1 oz)   SpO2 97%   BMI 25.65 kg/m²       Patient is status post General anesthesia for Procedure(s):  L4-5 POSTERIOR FUSION (surg pain sol). Nausea/Vomiting: None    Postoperative hydration reviewed and adequate. Pain:  Pain Scale 1: Numeric (0 - 10) (10/15/19 0726)  Pain Intensity 1: 7 (10/15/19 0726)   Managed    Neurological Status:   Neuro (WDL): Exceptions to [facility] (10/15/19 9992)  Neuro  Neurologic State: Appropriate for age; Alert (10/14/19 2011)  Orientation Level: Oriented X4 (10/14/19 2011)  Cognition: Follows commands (10/14/19 2011)  Speech: Clear (10/14/19 2011)  LUE Motor Response: Purposeful (10/14/19 2011)  LLE Motor Response: Weak (10/15/19 0736)  RUE Motor Response: Purposeful (10/14/19 2011)  RLE Motor Response: Weak (10/15/19 0736)   At baseline    Mental Status, Level of Consciousness: Alert and  oriented to person, place, and time    Pulmonary Status:   O2 Device: Nasal cannula (10/15/19 1006)   Adequate oxygenation and airway patent    Complications related to anesthesia: None    Post-anesthesia assessment completed.  No concerns    Signed By: Rich Martin MD     October 15, 2019
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
## 2019-10-15 NOTE — PROGRESS NOTES
Ortho/ NeuroSurgery NP Note    POD# 0  s/p L4-5 POSTERIOR FUSION (surg pain sol)   POD# 1 s/p L4-5 Lateral fusion     Pt sitting up in bed, eating lunch, with family at bedside. Complaints of 7/10 pain. VSS Afebrile. Patient has had something to eat/drink. No nausea. Most Recent Labs:   Lab Results   Component Value Date/Time    HGB 9.2 (L) 10/15/2019 03:27 AM    Hemoglobin A1c 5.4 10/02/2019 03:09 PM       Body mass index is 25.65 kg/m². Reference: BMI greater than 30 is classified as obesity and greater than 40 is classified as morbid obesity. STOP BANG Score: 1    Voiding status: needs to void  Left lateral Dressing c.d.i  Two posterior dressing c.d.i    Calves soft and supple;  No pain with passive stretch  Sensation and motor intact  SCDs for mechanical DVT proph    Plan:  1) PT BID starting today  2) Mary-op Antibiotics Vancomycin  3) Pain management: Dilaudid 2/4 mg every 3 hours PRN   4) Pepcid for GI Prophylaxis   5) hx of HTN: home metoprolol ordered   6) OBR ordered  7) Discharge plans pending progression with therapy, and ability to void, tomorrow vs Thursday     Readiness for discharge:     [x] Vital Signs stable    [x] Hgb stable    [] + Voiding - has not voided, monitor for POUR, and follow protocol    [x] Incision intact, drainage minimal    [x] Tolerating PO intake     [] Cleared by PT (OT if applicable)     [] Stair training completed (if applicable)    [] Independent/Contact Guard ambulation with assistive device (household distance)     [] Bed mobility     [] Car transfers     [] ADLs    [x] Adequate pain control on oral medication alone      Tory Ashley NP

## 2019-10-15 NOTE — ANESTHESIA PREPROCEDURE EVALUATION
Relevant Problems   No relevant active problems       Anesthetic History   No history of anesthetic complications            Review of Systems / Medical History  Patient summary reviewed, nursing notes reviewed and pertinent labs reviewed    Pulmonary    COPD               Neuro/Psych         Headaches and psychiatric history     Cardiovascular    Hypertension          CAD    Exercise tolerance: >4 METS  Comments: Mild to mod AI     GI/Hepatic/Renal     GERD      PUD     Endo/Other  Within defined limits           Other Findings              Physical Exam    Airway  Mallampati: II  TM Distance: 4 - 6 cm  Neck ROM: normal range of motion   Mouth opening: Normal     Cardiovascular          Murmur: Grade 3, Aortic area     Dental    Dentition: Full upper dentures and Full lower dentures     Pulmonary  Breath sounds clear to auscultation               Abdominal  GI exam deferred       Other Findings            Anesthetic Plan    ASA: 3  Anesthesia type: general    Monitoring Plan: BIS      Induction: Intravenous  Anesthetic plan and risks discussed with: Patient

## 2019-10-16 ENCOUNTER — APPOINTMENT (OUTPATIENT)
Dept: GENERAL RADIOLOGY | Age: 64
DRG: 455 | End: 2019-10-16
Attending: ORTHOPAEDIC SURGERY
Payer: COMMERCIAL

## 2019-10-16 VITALS
HEART RATE: 87 BPM | WEIGHT: 121.69 LBS | BODY MASS INDEX: 25.54 KG/M2 | SYSTOLIC BLOOD PRESSURE: 139 MMHG | TEMPERATURE: 99.3 F | RESPIRATION RATE: 18 BRPM | HEIGHT: 58 IN | OXYGEN SATURATION: 99 % | DIASTOLIC BLOOD PRESSURE: 62 MMHG

## 2019-10-16 LAB
ERYTHROCYTE [DISTWIDTH] IN BLOOD BY AUTOMATED COUNT: 13.1 % (ref 11.5–14.5)
HCT VFR BLD AUTO: 29 % (ref 35–47)
HGB BLD-MCNC: 9 G/DL (ref 11.5–16)
MCH RBC QN AUTO: 31.8 PG (ref 26–34)
MCHC RBC AUTO-ENTMCNC: 31 G/DL (ref 30–36.5)
MCV RBC AUTO: 102.5 FL (ref 80–99)
NRBC # BLD: 0 K/UL (ref 0–0.01)
NRBC BLD-RTO: 0 PER 100 WBC
PLATELET # BLD AUTO: 191 K/UL (ref 150–400)
PMV BLD AUTO: 10 FL (ref 8.9–12.9)
RBC # BLD AUTO: 2.83 M/UL (ref 3.8–5.2)
WBC # BLD AUTO: 10.8 K/UL (ref 3.6–11)

## 2019-10-16 PROCEDURE — 77030041034 HC KT HIP PATT -B

## 2019-10-16 PROCEDURE — 85027 COMPLETE CBC AUTOMATED: CPT

## 2019-10-16 PROCEDURE — 97116 GAIT TRAINING THERAPY: CPT

## 2019-10-16 PROCEDURE — 97530 THERAPEUTIC ACTIVITIES: CPT

## 2019-10-16 PROCEDURE — 94760 N-INVAS EAR/PLS OXIMETRY 1: CPT

## 2019-10-16 PROCEDURE — 74011250637 HC RX REV CODE- 250/637: Performed by: NURSE PRACTITIONER

## 2019-10-16 PROCEDURE — 97535 SELF CARE MNGMENT TRAINING: CPT

## 2019-10-16 PROCEDURE — 36415 COLL VENOUS BLD VENIPUNCTURE: CPT

## 2019-10-16 PROCEDURE — 74011250637 HC RX REV CODE- 250/637: Performed by: ORTHOPAEDIC SURGERY

## 2019-10-16 PROCEDURE — 72100 X-RAY EXAM L-S SPINE 2/3 VWS: CPT

## 2019-10-16 RX ADMIN — ACETAMINOPHEN 1000 MG: 500 TABLET ORAL at 00:27

## 2019-10-16 RX ADMIN — HYDROMORPHONE HYDROCHLORIDE 4 MG: 2 TABLET ORAL at 15:16

## 2019-10-16 RX ADMIN — Medication 200 MG: at 08:42

## 2019-10-16 RX ADMIN — HYDROMORPHONE HYDROCHLORIDE 4 MG: 2 TABLET ORAL at 08:43

## 2019-10-16 RX ADMIN — SENNOSIDES AND DOCUSATE SODIUM 1 TABLET: 8.6; 5 TABLET ORAL at 08:42

## 2019-10-16 RX ADMIN — DIAZEPAM 5 MG: 5 TABLET ORAL at 00:26

## 2019-10-16 RX ADMIN — Medication 10 ML: at 05:59

## 2019-10-16 RX ADMIN — ACETAMINOPHEN 1000 MG: 500 TABLET ORAL at 06:00

## 2019-10-16 RX ADMIN — VITAMIN D, TAB 1000IU (100/BT) 5 TABLET: 25 TAB at 08:42

## 2019-10-16 RX ADMIN — VITAM B12 100 MCG: 100 TAB at 08:43

## 2019-10-16 RX ADMIN — FAMOTIDINE 20 MG: 20 TABLET ORAL at 08:42

## 2019-10-16 RX ADMIN — VITAMIN E CAP 100 UNIT 200 UNITS: 100 CAP at 08:43

## 2019-10-16 RX ADMIN — HYDROMORPHONE HYDROCHLORIDE 4 MG: 2 TABLET ORAL at 04:31

## 2019-10-16 RX ADMIN — ALPRAZOLAM 0.5 MG: 0.5 TABLET ORAL at 08:42

## 2019-10-16 RX ADMIN — POLYETHYLENE GLYCOL 3350 17 G: 17 POWDER, FOR SOLUTION ORAL at 08:43

## 2019-10-16 RX ADMIN — OMEGA-3 FATTY ACIDS CAP 1000 MG 1 CAPSULE: 1000 CAP at 08:42

## 2019-10-16 RX ADMIN — ATORVASTATIN CALCIUM 20 MG: 20 TABLET, FILM COATED ORAL at 08:42

## 2019-10-16 RX ADMIN — DIAZEPAM 5 MG: 5 TABLET ORAL at 11:52

## 2019-10-16 RX ADMIN — GABAPENTIN 100 MG: 100 CAPSULE ORAL at 08:49

## 2019-10-16 RX ADMIN — TOPIRAMATE 100 MG: 100 TABLET, FILM COATED ORAL at 08:42

## 2019-10-16 RX ADMIN — METOPROLOL TARTRATE 25 MG: 25 TABLET ORAL at 07:00

## 2019-10-16 NOTE — OP NOTES
Formerly [facility]  OPERATIVE REPORT    Name:  Orly Gonzalez  MR#:  [id number]  :  1955  ACCOUNT #:  [de-identified]  DATE OF SERVICE:  10/14/2019    PREOPERATIVE DIAGNOSES:  1. L4-L5 spondylolisthesis. 2.  Lumbago. 3.  Sciatica. 4.  Spinal stenosis and claudication. POSTOPERATIVE DIAGNOSES:  1. L4-L5 spondylolisthesis. 2.  Lumbago. 3.  Sciatica. 4.  Spinal stenosis and claudication. PROCEDURES PERFORMED:  1. L4-L5 posterior fusion. 2. L4-L5 posterior instrumentation. 3.  Use of robotic surgery for safe placement of pedicle screws. 4.  Use of allograft bone for spine fusion. 5.  Use of bone marrow aspirate from the left posterior superior iliac spine for spinal fusion augmentation. SURGEON:  Ronnie Nunes MD    FIRST ASSISTANT:  Vineet Camargo. Anjum Pool PA-C    ANESTHESIA:  General.    COMPLICATIONS:  None. SPECIMENS REMOVED:  None. IMPLANTS:  Glob[employer] pedicle screw system. ESTIMATED BLOOD LOSS:  50 mL. DRAINS:  None. INDICATIONS FOR THE PROCEDURE:  The patient is a pleasant 53-year-old lady with lumbar spinal stenosis due to spondylolisthesis. She has failed to improve with non-operative treatment, and at this point would like to proceed with surgical intervention. We have given her warnings about possible complications including, but not limited to pain, scar, bleeding, infection, nonunion, damage to surrounding structures, death, paralysis, blindness, stroke. She understands and wants to proceed. NARRATIVE OF THE PROCEDURE:  After informed consent was obtained and the operative site was properly marked, the patient was moved back to the operating room and underwent general endotracheal anesthesia. She was positioned prone on the operating room table using the Marci Garb table with 4 poster frame. Her arms were placed in the 90-90 position and knees were gently bent with pillows.   Fluoroscopy was used to alfred the level of the incision, and we then proceeded to prep and drape in the usual manner. Time-out was obtained verifying that this was the correct patient, the correct surgery, the correct site, as well as that she had received IV antibiotics within 30 minutes of the incision. In     this case, she received vancomycin due to her allergy to penicillin. I then proceeded to perform a stab incision over the left posterior superior iliac spine, and inserted a Jamshidi needle to aspirate 60 mL of bone marrow, which was used to augment the bone graft using the fusion. I then proceeded to place a surveillance marker for the [employer] robotic navigation system. I proceeded to place a marker on the opposite iliac crest and brought in the C-arm to register the correct levels. Once C-arm had registered both AP and lateral x-rays for L4 and L5, fluoroscopy was removed. The [employer] robotic navigation system then merged with preoperative CT with preoperative planning for the pedicle screws with the current fluoroscopic images. Once that was completed, I proceeded to use the robot to alfred for the Tala approach and proceeded to inject the area with lidocaine with epinephrine to decrease bleeding. The bilateral Tala approaches were performed and dissected down to the bone bluntly. I then used the [employer] robotic system to complete at L4 on the right, and then used a blunt dilator, followed by using the high-speed opening drill and reamer, finally by placing the premeasured screw. Once this was completed at L4, I moved down to L5 and once completed on the right side, we moved the robot to the left and repeated the procedure in the exact same manner. With L4 screws placed, I removed the robot and brought in fluoroscopy to verify that the correct placement of the screws had been performed.   Once that was completed, I decorticated the posterior elements bilaterally, placed the allograft bone, soaked on bone marrow aspirate between L4 and L5 bilaterally for the posterior fusion at L4-L5. I then placed my rods between L4 and L5 bilaterally with the locking caps and final tightened the construct in place. Once this was completed, final x-rays were taken and saved to PACS. I proceeded to close the fascia with #1 Vicryl, subcu with 2-0 Vicryl, and the skin with 3-0 running Monocryl and Dermabond. Sterile dressing was applied. The patient was then awakened and transferred to PACU in stable condition. POSTOPERATIVE PLAN:  The patient will remain here overnight. We are going to give her SCDs and SJ hose for DVT prophylaxis, and Ancef for infection prophylaxis.       Himanshu Calhoun MD      [id number]  D:  10/15/2019 9:54  T:  10/15/2019 19:24  JOB #:  [id number]  CC:  Eladio Jacobsen MD

## 2019-10-16 NOTE — PROGRESS NOTES
Problem: Pressure Injury - Risk of  Goal: *Prevention of pressure injury  Description  Document Stevie Scale and appropriate interventions in the flowsheet. Outcome: Progressing Towards Goal  Note:   Pressure Injury Interventions:             Activity Interventions: Increase time out of bed, Pressure redistribution bed/mattress(bed type), PT/OT evaluation    Mobility Interventions: HOB 30 degrees or less, Pressure redistribution bed/mattress (bed type), PT/OT evaluation    Nutrition Interventions: Document food/fluid/supplement intake

## 2019-10-16 NOTE — PROGRESS NOTES
7862 Assessment completed and documented in flowsheet , call light within reach and bed at lowest position

## 2019-10-16 NOTE — PROGRESS NOTES
ORTHO POST OP SPINE PROGRESS NOTE    2019  Admit Date: 10/14/2019  Admit Diagnosis: Cervical stenosis of spinal canal [M48.02]  Procedure: Procedure(s):  L4-5 POSTERIOR FUSION (surg pain sol)  Post Op day: 1 Day Post-Op    Subjective:     Jailene Coffey is a patient who has complaints Of pain in the low back and bilateral hips status post L4-5 lateral and posterior fusion done in a staged fashion. She has pain in her left greater than right hip as well as pain in the low back. She is tolerating p.o. and able to void. She feels the majority of her pain currently is muscular and that her preoperative pain has improved. Review of Systems: Pertinent items are noted in HPI. Objective:     PT/OT:   Distance Ambulated:           Time Ambulated (min):        Ambulation Response: Activity Response: Fairly tolerated  Assistive Device:              Assistive Device: Fall prevention device    Vital Signs:    Blood pressure 159/59, pulse 90, temperature 98.3 °F (36.8 °C), resp. rate 16, height 4' 9.75\" (1.467 m), weight 55.2 kg (121 lb 11.1 oz), SpO2 96 %. Temp (24hrs), Av.5 °F (36.9 °C), Min:97.4 °F (36.3 °C), Max:99.8 °F (37.7 °C)      No intake/output data recorded. 10/14 1901 - 10/16 0700  In: 1270 [P.O.:270; I.V.:1000]  Out: 3350 [Urine:3300]    LAB:    Recent Labs     10/16/19  0557   HGB 9.0*   WBC 10.8          Wound/Drain Assessment:  Drain:      Dressing:     Physical Exam:  Neurological: no deficit  Incision clean, dry, and intact  5/5 strength bilateral lower extremities with the exception of the left hip flexors and knee extensors    Assessment:      Patient Active Problem List   Diagnosis Code    Cervical stenosis of spinal canal M48.02       Plan:     Continue PT/OT/Rehab  Discontinue: IV  Consult: PT  and OT    Discharge To: Home likely.   Today versus tomorrow pending progress with PT /OT

## 2019-10-16 NOTE — PROGRESS NOTES
Problem: Mobility Impaired (Adult and Pediatric)  Goal: *Acute Goals and Plan of Care (Insert Text)  Description  FUNCTIONAL STATUS PRIOR TO ADMISSION: Patient was ambulatory for household distances without AD, used motorized scooter in community for longer distances. HOME SUPPORT PRIOR TO ADMISSION: Patient lives with significant other, did not require assist with ADLs    Physical Therapy Goals  Initiated 10/15/2019    1. Patient will move from supine to sit and sit to supine  in bed with independence within 4 days. 2. Patient will perform sit to stand with supervision/set-up within 4 days. 3. Patient will ambulate with supervision/set-up for 200 feet with the least restrictive device within 4 days. 4. Patient will ascend/descend 6 stairs with  handrail(s) with minimal assistance/contact guard assist within 4 days. 5. Patient will verbalize and demonstrate understanding of spinal precautions (No bending, lifting greater than 5 lbs, or twisting; log-roll technique; frequent repositioning as instructed) within 4 days. 10/16/2019 1558 by Carli Kang PTA  Outcome: Resolved/Met  10/16/2019 1148 by Carli Kang PTA  Outcome: Progressing Towards Goal   PHYSICAL THERAPY TREATMENT  Patient: David Sewell (71 y.o. female)  Date: 10/16/2019  Diagnosis: Cervical stenosis of spinal canal [M48.02] <principal problem not specified>  Procedure(s) (LRB):  L4-5 POSTERIOR FUSION (surg pain sol) (N/A) 1 Day Post-Op  Precautions:   No bending, no lifting greater than 5 lbs, no twisting, log-roll technique, repositioning every 20-30 min except when sleeping, brace when OOB (if ordered)  Chart, physical therapy assessment, plan of care and goals were reviewed. ASSESSMENT  Patient continues with skilled PT services and is progressing towards goals. Pt presents with decreased strength and endurance. Pt performed supine to sit at min A/CGA with cueing for sequencing for log roll.  Pt performed sit to stand transfer at [facility] with cueing for hand placement and to shift anteriorly to stand. Pt able to correct. Pt ambulated 100ft x2 with RW at [facility]. Pt with improved stability and posture with time. Pt ascended and descended 4 steps with left railing at [facility] with cueing for sequencing. Pt performed a car transfer at [place] [id number] with cueing for sequencing. Pt moving well with no safety concerns. From physical therapy stand point pt cleared for discharge. Current Level of Function Impacting Discharge (mobility/balance): Pt requiring min A/CGA for mobility. Other factors to consider for discharge: Pain         PLAN :  Patient continues to benefit from skilled intervention to address the above impairments. Continue treatment per established plan of care. to address goals. Recommendation for discharge: (in order for the patient to meet his/her long term goals)  Physical therapy at least 2 days/week in the home     This discharge recommendation:  Has been made in collaboration with the attending provider and/or case management    Equipment recommendations for successful discharge (if) home: rolling walker       SUBJECTIVE:   Patient stated  It hurts but I can do this.     OBJECTIVE DATA SUMMARY:   Critical Behavior:  Neurologic State: Alert  Orientation Level: Oriented X4  Cognition: Follows commands  Safety/Judgement: Fall prevention    Spinal diagnosis intervention:  The patient stated 3/3 back precautions when prompted. Reviewed all 3 back precautions, log roll technique, and sitting for 30 minutes at a time.     Functional Mobility Training:    Bed Mobility:  Log Rolling: Contact guard assistance;Minimum assistance  Supine to Sit: Minimum assistance;Contact guard assistance              Transfers:  Sit to Stand: Contact guard assistance  Stand to Sit: Contact guard assistance        Bed to Chair: Contact guard assistance                    Balance:  Sitting: Intact  Standing: Impaired  Standing - Static: Good;Constant support  Standing - Dynamic : Fair;Good;Constant support  Ambulation/Gait Training:  Distance (ft): 200 Feet (ft)(100ft x2)  Assistive Device: Gait belt;Walker, rolling  Ambulation - Level of Assistance: Contact guard assistance        Gait Abnormalities: Decreased step clearance        Base of Support: Narrowed     Speed/Radha: Pace decreased (<100 feet/min)  Step Length: Right shortened;Left shortened                    Stairs:  Number of Stairs Trained: 4  Stairs - Level of Assistance: Contact guard assistance   Rail Use: Left     Pain Rating:  Pt with increased pain with mobility. Activity Tolerance:   Good with improved mobility tolerance and safety. Please refer to the flowsheet for vital signs taken during this treatment.     After treatment patient left in no apparent distress:   Sitting in chair, Call bell within reach and Caregiver / family present    COMMUNICATION/COLLABORATION:   The patients plan of care was discussed with: Registered Nurse    Walter Royal PTA   Time Calculation: 23 mins

## 2019-10-16 NOTE — PROGRESS NOTES
VALERIE:   1) Home with family assistance and rolling walker     CM Consult Noted-   CM met with pt regarding RW. Pt states she does not have one and has not had one in the past 5 years. Referral sent to [facility], waiting on response. Update- Pt was provided with a RW from [facility]. AVS updated with information. Pt will follow up outpatient with Dr. Valerie Gold per back protocol. Pt is cleared to discharge from CM perspective at this time.      Adilson Brand, YENNIFER, [address]   [phone]

## 2019-10-16 NOTE — PROGRESS NOTES
Ortho / Neurosurgery NP Note    POD# 1 s/p L4-5 POSTERIOR FUSION (surg pain sol) POD #2 s/p L4-5 lateral fusion      Pt resting in bed eating breakfast.  States that pain is manageable at present. Primarily located in groins bilaterally. Preoperatively, pain was in hips/upper thighs, L>R, which she feels has improved following surgery. Denies n/v.        VSS Afebrile. Voiding status: spontaneous void    Output (mL)  Urine Voided: 300 ml (10/16/19 0431)        Labs  Lab Results   Component Value Date/Time    HGB 9.0 (L) 10/16/2019 05:57 AM      Lab Results   Component Value Date/Time    INR 1.0 10/02/2019 03:09 PM          Body mass index is 25.65 kg/m². : A BMI > 30 is classified as obesity and > 40 is classified as morbid obesity. Alert and oriented x 3, appropriately interactive. Posterior (2) prineo dressing c.d.i.; L lateral dressing c.d.i. as well. Calves soft and supple; No pain with passive stretch  Sensation and motor intact  SCDs for mechanical DVT proph while in bed  Strength 5/5 in LEs with dorsi/plantar flex. PLAN:  1) PT BID up ad rossana  2) GI Prophylaxis - Pepcid  3) Pain management -- Continue scheduled tylenol 1000 mg q6hrs + PRN dilaudid 2 mg or 4 mg PO q3hrs and PRN valium 5 mg PO q6hrs as patient is currently satisfied with pain management. 4) Hx of HTN -  BP this AM was 159/59. Continue home metoprolol   5) Readniess for discharge:     [x] Vital Signs stable    [x] Hgb stable  -   9.0 this AM (was 9.2 yesterday)   [x] + Voiding    [x] Wound intact, drainage minimal    [x] Tolerating PO intake     [] Cleared by PT (OT if applicable)     [] Stair training completed (if applicable)    [] Independent / Contact Guard Assist (household distance)     [] Bed mobility     [] Car transfers     [] ADLs    [x] Adequate pain control on oral medication alone     D/C to home possibly today or tomorrow pending PT progression.      Jayda Acharya BSN RN THALIA   MSN/AGACNP Student

## 2019-10-16 NOTE — PROGRESS NOTES
Problem: Mobility Impaired (Adult and Pediatric)  Goal: *Acute Goals and Plan of Care (Insert Text)  Description  FUNCTIONAL STATUS PRIOR TO ADMISSION: Patient was ambulatory for household distances without AD, used motorized scooter in community for longer distances. HOME SUPPORT PRIOR TO ADMISSION: Patient lives with significant other, did not require assist with ADLs    Physical Therapy Goals  Initiated 10/15/2019    1. Patient will move from supine to sit and sit to supine  in bed with independence within 4 days. 2. Patient will perform sit to stand with supervision/set-up within 4 days. 3. Patient will ambulate with supervision/set-up for 200 feet with the least restrictive device within 4 days. 4. Patient will ascend/descend 6 stairs with  handrail(s) with minimal assistance/contact guard assist within 4 days. 5. Patient will verbalize and demonstrate understanding of spinal precautions (No bending, lifting greater than 5 lbs, or twisting; log-roll technique; frequent repositioning as instructed) within 4 days. Outcome: Progressing Towards Goal   PHYSICAL THERAPY TREATMENT  Patient: Kwadwo Lucas (26 y.o. female)  Date: 10/16/2019  Diagnosis: Cervical stenosis of spinal canal [M48.02] <principal problem not specified>  Procedure(s) (LRB):  L4-5 POSTERIOR FUSION (surg pain sol) (N/A) 1 Day Post-Op  Precautions:   No bending, no lifting greater than 5 lbs, no twisting, log-roll technique, repositioning every 20-30 min except when sleeping, brace when OOB (if ordered)  Chart, physical therapy assessment, plan of care and goals were reviewed. ASSESSMENT  Patient continues with skilled PT services and is progressing towards goals. Pt presents with decreased strength and increased pain with mobility. Pt performed sit to stand transfer at mod A/min A with cueing for hand placement. Pt with initial posterior lean bur able to correct with cueing. Pt ambulated 85ft with RW at [facility].  Pt requiring cueing for sequencing and for step through gait. With time pt able to relax shoulders and bear increased weight on LEs. Pts LEs shaky initially due to weakness. Pt performed seated exercises and educated on performing throughout the day to improve LEs strength. Current Level of Function Impacting Discharge (mobility/balance): Pt requiring mod A for sit to stand transfer. Pt requiring CGA for ambulation. Other factors to consider for discharge: fall risk         PLAN :  Patient continues to benefit from skilled intervention to address the above impairments. Continue treatment per established plan of care. to address goals. Recommendation for discharge: (in order for the patient to meet his/her long term goals)  Physical therapy at least 2 days/week in the home AND ensure assist and/or supervision for safety with mobility. This discharge recommendation:  Has been made in collaboration with the attending provider and/or case management    Equipment recommendations for successful discharge (if) home: rolling walker       SUBJECTIVE:   Patient stated  It hurts but I'm going to work through it .     OBJECTIVE DATA SUMMARY:   Critical Behavior:  Neurologic State: Alert, Appropriate for age  Orientation Level: Oriented X4  Cognition: Appropriate decision making, Appropriate for age attention/concentration, Appropriate safety awareness, Follows commands  Safety/Judgement: Awareness of environment    Spinal diagnosis intervention:  The patient stated 3/3 back precautions when prompted. Reviewed all 3 back precautions, log roll technique, and sitting for 30 minutes at a time. Functional Mobility Training:    Bed Mobility:  Pt received on [facility]                  Transfers:  Sit to Stand: Moderate assistance;Minimum assistance  Stand to Sit: Contact guard assistance                             Balance:  Sitting: Intact  Standing: Impaired; With support  Standing - Static: Fair;Constant support  Standing - Dynamic : Fair;Constant support  Ambulation/Gait Training:  Distance (ft): 80 Feet (ft)  Assistive Device: Gait belt;Walker, rolling  Ambulation - Level of Assistance: Contact guard assistance        Gait Abnormalities: Decreased step clearance; Step to gait(LEs shaky due to weakness)        Base of Support: Narrowed     Speed/Radha: Slow  Step Length: Right shortened;Left shortened              Therapeutic Exercises:   Seated  LAQ x5  Marching x10  Heel and toe raises x10  Pain Rating:  Pt with increased pain mobility. Activity Tolerance:   Fair , pt with increased pain with mobility but also with increased stability safety with time. Please refer to the flowsheet for vital signs taken during this treatment.     After treatment patient left in no apparent distress:   Sitting in chair and Call bell within reach    COMMUNICATION/COLLABORATION:   The patients plan of care was discussed with: Registered Nurse    Brent Smith PTA   Time Calculation: 23 mins

## 2019-10-16 NOTE — PROGRESS NOTES
Problem: Self Care Deficits Care Plan (Adult)  Goal: *Acute Goals and Plan of Care (Insert Text)  Description  FUNCTIONAL STATUS PRIOR TO ADMISSION: Patient was independent and active without use of DME.    HOME SUPPORT: The patient lived with family and stated she needed assist at times. .    Occupational Therapy Goals  Initiated 10/15/2019    1. Patient will perform lower body dressing with supervision/set-up using [employer] within 7 days. 2.  Patient will perform bathing with minimal assistance/contact guard assist using most appropriate DME within 7 days. 3.  Patient will toileting at supervision/set-up within 7 days. 4.  Patient will don/doff back brace at supervision/set-up within 7 days. 5.  Patient will verbalize/demonstrate 3/3 back precautions during ADL tasks without cues within 7 days. Outcome: Progressing Towards Goal   OCCUPATIONAL THERAPY TREATMENT  Patient: Jignesh Mccullough (69 y.o. female)  Date: 10/16/2019  Diagnosis: Cervical stenosis of spinal canal [M48.02] <principal problem not specified>  Procedure(s) (LRB):  L4-5 POSTERIOR FUSION (surg pain sol) (N/A) 1 Day Post-Op  Precautions:    Chart, occupational therapy assessment, plan of care, and goals were reviewed. ASSESSMENT  Patient continues with skilled OT services and is progressing towards goals. Pt was supine in bed and min assist  for bed mobility, and was able to stand with CGA to min assist.  She was stating that her right leg/thigh hurts while OT was working on LB dressing with AE. Pt was issued hip kit and was able to dress LB with use of AE. She was CGA for transfer to toilet and able to stand at the sink to wash her hands with CGA. Pt is making progress and has supportive family. Worked with pt on hinging at her hips to come to stand and when she is sitting. She try's to sit and stand with out leaning forward and complains of increased back pain.       Current Level of Function Impacting Discharge (ADLs): pain in right LE, and pain with sit to stand and rolling. Other factors to consider for discharge: pt will benefit from assist from family and home care         PLAN :  Patient continues to benefit from skilled intervention to address the above impairments. Continue treatment per established plan of care. to address goals. Recommend with staff: John Mandel for meals and walk to bathroom     Recommend next OT session: work on LB dressing     Recommendation for discharge: (in order for the patient to meet his/her long term goals)  No skilled occupational therapy/ follow up rehabilitation needs identified at this time. This discharge recommendation:  Has not yet been discussed the attending provider and/or case management    Equipment recommendations for successful discharge (if) home: none       SUBJECTIVE:   Patient stated I have pain in this thigh.     OBJECTIVE DATA SUMMARY:   Cognitive/Behavioral Status:  Neurologic State: Alert  Orientation Level: Oriented X4  Cognition: Follows commands  Perception: Appears intact  Perseveration: No perseveration noted  Safety/Judgement: Fall prevention    Functional Mobility and Transfers for ADLs:  Bed Mobility:  Rolling: Minimum assistance;Assist x1  Supine to Sit: Minimum assistance;Assist x1    Transfers:  Sit to Stand: Minimum assistance  Functional Transfers  Bathroom Mobility: Contact guard assistance  Toilet Transfer : Contact guard assistance  Bed to Chair: Contact guard assistance    Balance:  Sitting: Intact  Standing: Impaired; With support  Standing - Static: Fair;Constant support  Standing - Dynamic : Fair;Constant support    ADL Intervention:  Feeding  Feeding Assistance: Independent    Grooming  Grooming Assistance: Set-up  Washing Face: Set-up  Washing Hands: Set-up  Brushing Teeth: Set-up         Lower Body Bathing  Bathing Assistance: Contact guard assistance  Perineal  : Contact guard assistance  Position Performed: Standing         Lower Body Dressing Assistance  Dressing Assistance: Contact guard assistance;Modified independent  Underpants: Contact guard assistance;Modified independent    Toileting  Toileting Assistance: Contact guard assistance  Bladder Hygiene: Contact guard assistance  Bowel Hygiene: Contact guard assistance    Cognitive Retraining  Safety/Judgement: Fall prevention      Activity Tolerance:   Fair  Please refer to the flowsheet for vital signs taken during this treatment. After treatment patient left in no apparent distress:    On stretcher with transport      COMMUNICATION/COLLABORATION:   The patients plan of care was discussed with: Physical Therapist, Registered Nurse,  and family     Dejah Smith  Time Calculation: 23 mins

## 2019-10-16 NOTE — PROGRESS NOTES
Reviewed discharge instructions, prescriptions, and side effects with patient and her family. Reviewed wound care, follow-up instructions, and medication instructions. Answered all questions and provided contact information for future questions. Took IV out per policy, Catheter tip intact. Provided Post-op Hygiene kit. Going home in a car with family.

## 2019-10-16 NOTE — DISCHARGE SUMMARY
Spine Discharge Summary    Patient ID:  Ashley Perales  [id number]  female  71 y.o.  1955    Admit date: 10/14/2019    Discharge date: 10/16/2019    Admitting Physician: Peter Moore MD     Consulting Physician(s):   Treatment Team: Attending Provider: Faizan Bay MD; Nurse Practitioner: Eder Aguero NP; Utilization Review: Andrea Higgins RN; Care Manager: Nando Ramires    Date of Surgery:   10/15/2019     Preoperative Diagnosis:  BILATERAL LEG PAIN, LUMBOSACRAL SPONDYLOSIS WITHOUT MYELOPATHY, ACQUIRED SPONDYLOLISTHESIS, FORMINAL STENOSIS OF LUMBAR REGION, WEAKNESS OF LIMB    Postoperative Diagnosis:   BILATERAL LEG PAIN, LUMBOSACRAL SPONDYLOSIS WITHOUT MYELOPATHY, ACQUIRED SPONDYLOLISTHESIS, FORMINAL STENOSIS OF LUMBAR REGION, WEAKNESS OF LIMB    Procedure(s):  L4-5 POSTERIOR FUSION (surg pain sol)     Anesthesia Type:   General     Surgeon: Faizan Bay MD                            HPI:  Pt is a 71 y.o. female who has a history of BILATERAL LEG PAIN, LUMBOSACRAL SPONDYLOSIS WITHOUT MYELOPATHY, ACQUIRED SPONDYLOLISTHESIS, [address]  STENOSIS OF LUMBAR REGION, WEAKNESS OF LIMB  with pain and limitations of activities of daily living who presents at this time for a L4-5 staged lateral and posterior fusion  following the failure of conservative management. PMH:   Past Medical History:   Diagnosis Date    Angina pectoris (HCC)     Blindness of right eye     Chronic obstructive pulmonary disease (HCC)     Chronic pain     GERD (gastroesophageal reflux disease)     Hyperglycemia     mild per cardiology notes    Hypertension     Ill-defined condition     migraine     Mild aortic regurgitation     Psychiatric disorder     anxiety and panic attacks    PUD (peptic ulcer disease)        Body mass index is 25.65 kg/m². : A BMI > 30 is classified as obesity and > 40 is classified as morbid obesity.      Medications upon admission :   Prior to Admission Medications   Prescriptions Last Dose Informant Patient Reported? Taking? ALPRAZolam (XANAX) 0.5 mg tablet 10/14/2019 at 0600  Yes Yes   Sig: Take  by mouth two (2) times a day. BIOTIN PO 10/7/2019 at Unknown time  Yes Yes   Sig: Take 20,000 mcg by mouth daily. COQ10, LIPOSOMAL UBIQUINOL, PO 10/7/2019 at Unknown time  Yes Yes   Sig: Take 200 mg by mouth daily. TURMERIC ROOT EXTRACT PO 10/7/2019 at Unknown time  Yes Yes   Sig: Take  by mouth daily. albuterol (PROAIR HFA) 90 mcg/actuation inhaler 10/13/2019 at Unknown time  Yes Yes   Sig: Take 2 Puffs by inhalation as needed for Wheezing. aspirin delayed-release 81 mg tablet 10/7/2019 at Unknown time  Yes Yes   Sig: Take 81 mg by mouth daily. atorvastatin (LIPITOR) 20 mg tablet 10/14/2019 at 0600  Yes Yes   Sig: Take 20 mg by mouth every morning. baclofen (LIORESAL) 10 mg tablet 10/14/2019 at 0600  Yes Yes   Sig: Take 10 mg by mouth three (3) times daily. cholecalciferol, VITAMIN D3, (VITAMIN D3) 5,000 unit tab tablet 10/13/2019 at Unknown time  Yes Yes   Sig: Take 5,000 Units by mouth daily. cyanocobalamin, vitamin B-12, (VITAMIN B-12 PO) 10/7/2019 at Unknown time  Yes Yes   Sig: Take  by mouth daily. diphenhydrAMINE (BENADRYL) 25 mg capsule Unknown at Unknown time  Yes No   Sig: Take 25 mg by mouth as needed. fluticasone propion-salmeterol (WIXELA INHUB) 250-50 mcg/dose diskus inhaler 10/14/2019 at 0600  Yes Yes   Sig: Take 1 Puff by inhalation two (2) times a day.   gabapentin (NEURONTIN) 100 mg capsule 10/14/2019 at 0600  Yes Yes   Sig: Take 100 mg by mouth three (3) times daily. metoprolol tartrate (LOPRESSOR) 25 mg tablet 10/14/2019 at 0600  Yes Yes   Sig: Take 25 mg by mouth every morning. omega-3/dha/epa/fish oil (OMEGA-3 PO) 10/7/2019 at Unknown time  Yes Yes   Sig: Take 1 Tab by mouth daily. promethazine (PHENERGAN) 25 mg tablet 9/14/2019 at Unknown time  Yes Yes   Sig: Take 25 mg by mouth as needed for Nausea.    topiramate (TOPAMAX) 100 mg tablet 10/14/2019 at 0600 Yes Yes   Sig: Take 100 mg by mouth two (2) times a day. vitamin E acetate (VITAMIN E PO) 10/7/2019 at Unknown time  Yes Yes   Sig: Take 180 mg by mouth daily. Facility-Administered Medications: None        Allergies: Allergies   Allergen Reactions    Ibuprofen Anaphylaxis    Penicillins Anaphylaxis    Aspirin Other (comments)     Ulcer hx  Takes 81 mg aspirin/usually takes a tums with aspirin    Codeine Hives and Shortness of Breath    Imitrex [Sumatriptan] Other (comments)     Heart attack    Premarin [Conjugated Estrogens] Other (comments)     Chest pressure    Stadol [Butorphanol Tartrate] Other (comments)     No relief    Strawberry Rash     Mild reaction (only on abdomen)--only fresh strawberries cause reaction  Can eat strawberry milkshake,strawberry ice cream,strawberry smoothie etc        Hospital Course: The patient underwent surgery. Complications:  None; patient tolerated the procedure well. Was taken to the PACU in stable condition and then transferred to the ortho floor. On POD 0 of posterior, stage 2 patient experienced orthostatic hypotension while working with therapy. On POD 1, BP remained stable, patient able to work with therapy. Perioperative Antibiotics:  Ancef     Postoperative Pain Management:  Oxycodone      Postoperative transfusions:    Number of units banked PRBCs =   none     Post Op complications: none    Hemoglobin at discharge:    Lab Results   Component Value Date/Time    HGB 9.0 (L) 10/16/2019 05:57 AM    INR 1.0 10/02/2019 03:09 PM       Dressing remained intact. Incision - clean, dry and intact. No significant erythema or swelling. Neurovascular exam found to be within normal limits. Wound appears to be healing without any evidence of infection. Physical Therapy started following surgery and participated in bed mobility, transfers and ambulation.         Gait:  Gait  Base of Support: Narrowed  Speed/Radha: Slow  Step Length: Right shortened, Left shortened  Gait Abnormalities: Decreased step clearance, Step to gait(LEs shaky due to weakness)  Ambulation - Level of Assistance: Contact guard assistance  Distance (ft): 80 Feet (ft)  Assistive Device: Gait belt, Walker, rolling                   Discharged to: Home. Condition on Discharge:   stable    Discharge instructions:    - Take pain medications as prescribed  - Resume pre hospital diet      - Discharge activity: activity as tolerated  - Ambulate as tolerated  - Wound Care Keep wound clean and dry. See discharge instruction sheet. -DISCHARGE MEDICATION LIST     Current Discharge Medication List      START taking these medications    Details   acetaminophen (TYLENOL) 500 mg tablet Take 2 Tabs by mouth every six (6) hours for 14 days. Qty: 112 Tab, Refills: 0      famotidine (PEPCID) 20 mg tablet Take 1 Tab by mouth daily for 30 days. Qty: 30 Tab, Refills: 0      HYDROmorphone (DILAUDID) 2 mg tablet Take 1 Tab by mouth every four (4) hours as needed for Pain for up to 14 days. Max Daily Amount: 12 mg.  Qty: 60 Tab, Refills: 0    Associated Diagnoses: S/P lumbar spinal fusion      polyethylene glycol (MIRALAX) 17 gram packet Take 1 Packet by mouth daily for 15 days. Qty: 15 Packet, Refills: 0      senna-docusate (PERICOLACE) 8.6-50 mg per tablet Take 1 Tab by mouth two (2) times a day for 15 days. Qty: 30 Tab, Refills: 0      naloxone (NARCAN) 4 mg/actuation nasal spray Use 1 spray intranasally, then discard. Repeat with new spray every 2 min as needed for opioid overdose symptoms, alternating nostrils. Qty: 1 Each, Refills: 0         CONTINUE these medications which have NOT CHANGED    Details   COQ10, LIPOSOMAL UBIQUINOL, PO Take 200 mg by mouth daily. BIOTIN PO Take 20,000 mcg by mouth daily. TURMERIC ROOT EXTRACT PO Take  by mouth daily. vitamin E acetate (VITAMIN E PO) Take 180 mg by mouth daily.       gabapentin (NEURONTIN) 100 mg capsule Take 100 mg by mouth three (3) times daily. metoprolol tartrate (LOPRESSOR) 25 mg tablet Take 25 mg by mouth every morning. promethazine (PHENERGAN) 25 mg tablet Take 25 mg by mouth as needed for Nausea. atorvastatin (LIPITOR) 20 mg tablet Take 20 mg by mouth every morning. albuterol (PROAIR HFA) 90 mcg/actuation inhaler Take 2 Puffs by inhalation as needed for Wheezing. fluticasone propion-salmeterol (WIXELA INHUB) 250-50 mcg/dose diskus inhaler Take 1 Puff by inhalation two (2) times a day. topiramate (TOPAMAX) 100 mg tablet Take 100 mg by mouth two (2) times a day. cyanocobalamin, vitamin B-12, (VITAMIN B-12 PO) Take  by mouth daily. cholecalciferol, VITAMIN D3, (VITAMIN D3) 5,000 unit tab tablet Take 5,000 Units by mouth daily. omega-3/dha/epa/fish oil (OMEGA-3 PO) Take 1 Tab by mouth daily. ALPRAZolam (XANAX) 0.5 mg tablet Take  by mouth two (2) times a day. baclofen (LIORESAL) 10 mg tablet Take 10 mg by mouth three (3) times daily. diphenhydrAMINE (BENADRYL) 25 mg capsule Take 25 mg by mouth as needed.          STOP taking these medications       aspirin delayed-release 81 mg tablet Comments:   Reason for Stopping:            per medical continuation form      -Follow up in office in 2 to 3 weeks      Signed:  Raquel Hernandez DNP, AGACNP-BC  Orthopaedic Nurse Practitioner    10/16/2019  3:02 PM

## 2020-01-27 NOTE — PROGRESS NOTES
Orthopedic End of Shift Note    Bedside and Verbal shift change report given to DILLON Huerta (oncoming nurse) by Dorothy Adames RN (offgoing nurse). Report included the following information SBAR, Kardex, OR Summary, Procedure Summary, Intake/Output, MAR and Recent Results.      POD# 1 (Lateral Fusion)     Significant issues during shift: pain management    Issues for Physician to address: n/a    Activity This Shift  (check all that apply) [] chair  [] dangle   [] bathroom  [] bedside commode [] hallway  [x] bedrest   Nausea/Vomiting [] yes [x] no     Voiding Status [] void [x] Guzman [] I&O Cath   Bowel Movements [] yes [x] no     Foot Pumps or SCD [x] yes [] no    Ice Pack [] yes    [x] no    Incentive Spirometer [] yes [x] no Volume:      Telemetry Monitoring   [] yes [x] no Rhythm:   Supplemental O2 [x] yes [] no Sat off O2:  2% yes

## (undated) DEVICE — Device

## (undated) DEVICE — 3M™ STERI-DRAPE™ INSTRUMENT POUCH 1018: Brand: STERI-DRAPE™

## (undated) DEVICE — DRAPE,LAP,CHOLE,W/TROUGHS,STERILE: Brand: MEDLINE

## (undated) DEVICE — SUTURE MCRYL SZ 2-0 L36IN ABSRB UD L36MM CT-1 1/2 CIR Y945H

## (undated) DEVICE — (D)PREP SKN CHLRAPRP APPL 26ML -- CONVERT TO ITEM 371833

## (undated) DEVICE — NEEDLE HYPO 22GA L1.5IN BLK S STL HUB POLYPR SHLD REG BVL

## (undated) DEVICE — TOOL 14MH30 LEGEND 14CM 3MM: Brand: MIDAS REX ™

## (undated) DEVICE — SUTURE ABSORBABLE MONOFILAMENT 2-0 8 IN ANTIBACT STRATAFIX SXMP1B409

## (undated) DEVICE — DRAPE MON DISP FOR EXCELSIUSGPS ROBOTIC NAVIGATION PLATFRM

## (undated) DEVICE — Z CONVERTED USE 2107985 COVER FLROSCP W36XL28IN 4 SIDE ADH

## (undated) DEVICE — LAMINECTOMY RICHMOND-LF: Brand: MEDLINE INDUSTRIES, INC.

## (undated) DEVICE — SUTURE V-LOC 180 SZ 0 L12IN ABSRB GRN L37MM GS-21 1/2 CIR VLOCL0316

## (undated) DEVICE — SOLUTION IV 1000ML 0.9% SOD CHL

## (undated) DEVICE — KIT JACK TBL PT CARE

## (undated) DEVICE — APPLIER LIG CLP M L11IN TI STR RNG HNDL FOR 20 CLP DISP

## (undated) DEVICE — ADHESIVE SKIN CLOSURE 4X22 CM PREMIERPRO EXOFINFUSION DISP

## (undated) DEVICE — INFECTION CONTROL KIT SYS

## (undated) DEVICE — DRAPE,CHEST,FENES,15X10,STERIL: Brand: MEDLINE

## (undated) DEVICE — AGENT HEMSTAT W4XL4IN OXIDIZED REGENERATED CELOS ABSRB SFT

## (undated) DEVICE — SPONGE GZ W4XL4IN COT 12 PLY TYP VII WVN C FLD DSGN

## (undated) DEVICE — ANNULOTOMY KNIFE

## (undated) DEVICE — SUTURE VCRL SZ 2-0 L18IN ABSRB UD CT-1 L36MM 1/2 CIR J839D

## (undated) DEVICE — 1010 S-DRAPE TOWEL DRAPE 10/BX: Brand: STERI-DRAPE™

## (undated) DEVICE — BIPOLAR FORCEPS CORD: Brand: VALLEYLAB

## (undated) DEVICE — BONE WAX WHITE: Brand: BONE WAX WHITE

## (undated) DEVICE — SURGIFOAM SPNG SZ 100

## (undated) DEVICE — FLOSEAL MATRIX IS INDICATED IN SURGICAL PROCEDURES (OTHER THAN IN OPHTHALMIC) AS AN ADJUNCT TO HEMOSTASIS WHEN CONTROL OF BLEEDING BY LIGATURE OR CONVENTIONALPROCEDURES IS INEFFECTIVE OR IMPRACTICAL.: Brand: FLOSEAL HEMOSTATIC MATRIX

## (undated) DEVICE — C-ARMOR C-ARM EQUIPMENT COVERS CLEAR STERILE UNIVERSAL FIT 12 PER CASE: Brand: C-ARMOR

## (undated) DEVICE — HANDLE LT SNAP ON ULT DURABLE LENS FOR TRUMPF ALC DISPOSABLE

## (undated) DEVICE — ELECTRODE BLDE L4IN NONINSULATED EDGE

## (undated) DEVICE — SOLUTION IRRIG 1000ML H2O STRL BLT

## (undated) DEVICE — STERILE POLYISOPRENE POWDER-FREE SURGICAL GLOVES WITH EMOLLIENT COATING: Brand: PROTEXIS

## (undated) DEVICE — SPHERE STEALTH 12PK/TY --

## (undated) DEVICE — BONE MARROW KIT ASPIR 11 GA

## (undated) DEVICE — GOWN,SIRUS,NONRNF,SETINSLV,2XL,18/CS: Brand: MEDLINE

## (undated) DEVICE — SUTURE STRATAFIX SPRL MCRYL + SZ 2-0 L18IN ABSRB UD CT-1 SXMP1B413

## (undated) DEVICE — DRAPE C ARM DISP FOR EXCELSIUSGPS ROBOTIC NAVIGATION PLATFRM

## (undated) DEVICE — CANISTER, RIGID, 3000CC: Brand: MEDLINE INDUSTRIES, INC.

## (undated) DEVICE — GARMENT,MEDLINE,DVT,INT,CALF,MED, GEN2: Brand: MEDLINE

## (undated) DEVICE — STERILE POLYISOPRENE POWDER-FREE SURGICAL GLOVES: Brand: PROTEXIS

## (undated) DEVICE — COVER,TABLE,60X90,STERILE: Brand: MEDLINE

## (undated) DEVICE — BNDG ADH FABRIC 2X4IN ST LF --

## (undated) DEVICE — TOTAL TRAY, 16FR 10ML SIL FOLEY, URN: Brand: MEDLINE

## (undated) DEVICE — LABEL MED CARD MRMC STRL

## (undated) DEVICE — SUTURE VCRL SZ 1 L18IN ABSRB VLT CT-1 L36MM 1/2 CIR J741D

## (undated) DEVICE — SYR 50ML LR LCK 1ML GRAD NSAF --

## (undated) DEVICE — TUBING IRRIG L77IN DIA0.241IN L BOR FOR CYSTO W/ NVENT

## (undated) DEVICE — NDL SPNE QNCKE 18GX3.5IN LF --